# Patient Record
Sex: MALE | Race: WHITE | NOT HISPANIC OR LATINO | Employment: FULL TIME | URBAN - NONMETROPOLITAN AREA
[De-identification: names, ages, dates, MRNs, and addresses within clinical notes are randomized per-mention and may not be internally consistent; named-entity substitution may affect disease eponyms.]

---

## 2022-04-27 ENCOUNTER — HOSPITAL ENCOUNTER (EMERGENCY)
Facility: HOSPITAL | Age: 27
Discharge: HOME | End: 2022-04-27
Attending: EMERGENCY MEDICINE | Admitting: EMERGENCY MEDICINE

## 2022-04-27 VITALS
SYSTOLIC BLOOD PRESSURE: 129 MMHG | TEMPERATURE: 98 F | HEIGHT: 68 IN | HEART RATE: 67 BPM | DIASTOLIC BLOOD PRESSURE: 75 MMHG | BODY MASS INDEX: 22.73 KG/M2 | WEIGHT: 150 LBS | OXYGEN SATURATION: 100 % | RESPIRATION RATE: 15 BRPM

## 2022-04-27 DIAGNOSIS — T78.40XA ALLERGIC REACTION, INITIAL ENCOUNTER: Primary | ICD-10-CM

## 2022-04-27 PROCEDURE — 96375 TX/PRO/DX INJ NEW DRUG ADDON: CPT

## 2022-04-27 PROCEDURE — 99283 EMERGENCY DEPT VISIT LOW MDM: CPT

## 2022-04-27 PROCEDURE — 25010000002 DIPHENHYDRAMINE PER 50 MG: Performed by: EMERGENCY MEDICINE

## 2022-04-27 PROCEDURE — 25010000002 DEXAMETHASONE SODIUM PHOSPHATE 20 MG/5ML SOLUTION

## 2022-04-27 PROCEDURE — 96365 THER/PROPH/DIAG IV INF INIT: CPT

## 2022-04-27 RX ORDER — DIPHENHYDRAMINE HYDROCHLORIDE 50 MG/ML
50 INJECTION INTRAMUSCULAR; INTRAVENOUS ONCE
Status: COMPLETED | OUTPATIENT
Start: 2022-04-27 | End: 2022-04-27

## 2022-04-27 RX ORDER — PREDNISONE 20 MG/1
20 TABLET ORAL
Qty: 15 TABLET | Refills: 0 | Status: SHIPPED | OUTPATIENT
Start: 2022-04-27

## 2022-04-27 RX ORDER — SODIUM CHLORIDE 0.9 % (FLUSH) 0.9 %
10 SYRINGE (ML) INJECTION AS NEEDED
Status: DISCONTINUED | OUTPATIENT
Start: 2022-04-27 | End: 2022-04-27 | Stop reason: HOSPADM

## 2022-04-27 RX ORDER — CETIRIZINE HYDROCHLORIDE 10 MG/1
10 TABLET ORAL ONCE
Status: COMPLETED | OUTPATIENT
Start: 2022-04-27 | End: 2022-04-27

## 2022-04-27 RX ADMIN — CETIRIZINE HYDROCHLORIDE 10 MG: 10 TABLET, FILM COATED ORAL at 08:45

## 2022-04-27 RX ADMIN — DIPHENHYDRAMINE HYDROCHLORIDE 50 MG: 50 INJECTION, SOLUTION INTRAMUSCULAR; INTRAVENOUS at 08:45

## 2023-11-07 ENCOUNTER — APPOINTMENT (OUTPATIENT)
Dept: CT IMAGING | Facility: HOSPITAL | Age: 28
End: 2023-11-07
Payer: COMMERCIAL

## 2023-11-07 ENCOUNTER — HOSPITAL ENCOUNTER (EMERGENCY)
Facility: HOSPITAL | Age: 28
Discharge: HOME | End: 2023-11-07
Attending: STUDENT IN AN ORGANIZED HEALTH CARE EDUCATION/TRAINING PROGRAM | Admitting: STUDENT IN AN ORGANIZED HEALTH CARE EDUCATION/TRAINING PROGRAM
Payer: COMMERCIAL

## 2023-11-07 VITALS
BODY MASS INDEX: 21.48 KG/M2 | RESPIRATION RATE: 20 BRPM | TEMPERATURE: 98.5 F | HEART RATE: 81 BPM | OXYGEN SATURATION: 97 % | WEIGHT: 145 LBS | SYSTOLIC BLOOD PRESSURE: 141 MMHG | HEIGHT: 69 IN | DIASTOLIC BLOOD PRESSURE: 84 MMHG

## 2023-11-07 DIAGNOSIS — S02.2XXA CLOSED FRACTURE OF NASAL BONE, INITIAL ENCOUNTER: Primary | ICD-10-CM

## 2023-11-07 PROCEDURE — 99284 EMERGENCY DEPT VISIT MOD MDM: CPT

## 2023-11-07 PROCEDURE — 70486 CT MAXILLOFACIAL W/O DYE: CPT | Performed by: RADIOLOGY

## 2023-11-07 PROCEDURE — 96372 THER/PROPH/DIAG INJ SC/IM: CPT

## 2023-11-07 PROCEDURE — 70480 CT ORBIT/EAR/FOSSA W/O DYE: CPT | Performed by: RADIOLOGY

## 2023-11-07 PROCEDURE — 25010000002 KETOROLAC TROMETHAMINE PER 15 MG: Performed by: NURSE PRACTITIONER

## 2023-11-07 PROCEDURE — 70486 CT MAXILLOFACIAL W/O DYE: CPT

## 2023-11-07 PROCEDURE — 25010000002 DICYCLOMINE PER 20 MG: Performed by: NURSE PRACTITIONER

## 2023-11-07 PROCEDURE — 70480 CT ORBIT/EAR/FOSSA W/O DYE: CPT

## 2023-11-07 RX ORDER — DICYCLOMINE HYDROCHLORIDE 10 MG/ML
20 INJECTION INTRAMUSCULAR ONCE
Status: COMPLETED | OUTPATIENT
Start: 2023-11-07 | End: 2023-11-07

## 2023-11-07 RX ORDER — AMOXICILLIN AND CLAVULANATE POTASSIUM 875; 125 MG/1; MG/1
1 TABLET, FILM COATED ORAL ONCE
Status: COMPLETED | OUTPATIENT
Start: 2023-11-07 | End: 2023-11-07

## 2023-11-07 RX ORDER — KETOROLAC TROMETHAMINE 30 MG/ML
60 INJECTION, SOLUTION INTRAMUSCULAR; INTRAVENOUS ONCE
Status: COMPLETED | OUTPATIENT
Start: 2023-11-07 | End: 2023-11-07

## 2023-11-07 RX ORDER — HYDROCODONE BITARTRATE AND ACETAMINOPHEN 7.5; 325 MG/1; MG/1
1 TABLET ORAL EVERY 4 HOURS PRN
Qty: 18 TABLET | Refills: 0 | Status: SHIPPED | OUTPATIENT
Start: 2023-11-07 | End: 2023-11-10

## 2023-11-07 RX ORDER — AMOXICILLIN AND CLAVULANATE POTASSIUM 875; 125 MG/1; MG/1
1 TABLET, FILM COATED ORAL 2 TIMES DAILY
Qty: 14 TABLET | Refills: 0 | Status: SHIPPED | OUTPATIENT
Start: 2023-11-07 | End: 2023-11-14

## 2023-11-07 RX ORDER — HYDROCODONE BITARTRATE AND ACETAMINOPHEN 7.5; 325 MG/1; MG/1
1 TABLET ORAL ONCE
Status: COMPLETED | OUTPATIENT
Start: 2023-11-07 | End: 2023-11-07

## 2023-11-07 RX ADMIN — KETOROLAC TROMETHAMINE 60 MG: 60 INJECTION, SOLUTION INTRAMUSCULAR at 17:18

## 2023-11-07 RX ADMIN — HYDROCODONE BITARTRATE AND ACETAMINOPHEN 1 TABLET: 7.5; 325 TABLET ORAL at 15:33

## 2023-11-07 RX ADMIN — DICYCLOMINE HYDROCHLORIDE 20 MG: 20 INJECTION, SOLUTION INTRAMUSCULAR at 17:18

## 2023-11-07 RX ADMIN — AMOXICILLIN AND CLAVULANATE POTASSIUM 1 TABLET: 875; 125 TABLET, FILM COATED ORAL at 17:18

## 2023-11-07 NOTE — Clinical Note
[facility] EMERGENCY DEPARTMENT  [address] [facility] [address]  Phone: [phone]    Luke Wadsworth was seen and treated in our emergency department on 11/7/2023.  He may return to work on 11/09/2023.  ?       Thank you for choosing [facility].    Lizeth Calabrese APRN

## 2023-11-08 NOTE — ED PROVIDER NOTES
Subjective   History of Present Illness  Patient is a 28 year old male with no PMH. He presents to the ED for nasal injury and left facial injury. 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 He reports swelling, pain and bruising to his left side face and nose. Denies any other significant complaints.        Review of Systems   Constitutional: Negative.  Negative for fever.   HENT: Negative.  Positive for facial swelling.    Eyes: Negative.    Respiratory: Negative.     Cardiovascular: Negative.  Negative for chest pain.   Gastrointestinal: Negative.  Negative for abdominal pain.   Endocrine: Negative.    Genitourinary: Negative.  Negative for dysuria.   Musculoskeletal: Negative.    Skin: Negative.    Allergic/Immunologic: Negative.    Neurological: Negative.    Hematological: Negative.    Psychiatric/Behavioral: Negative.     All other systems reviewed and are negative.      No past medical history on file.    No Known Allergies    No past surgical history on file.    No family history on file.    Social History     Socioeconomic History    Marital status:            Objective   Physical Exam  Vitals and nursing note reviewed.   Constitutional:       General: He is not in acute distress.     Appearance: He is well-developed. He is not diaphoretic.   HENT:      Head: Normocephalic and atraumatic.        Right Ear: External ear normal.      Left Ear: External ear normal.      Nose: Signs of injury and nasal tenderness present. No septal deviation.     Eyes:      Conjunctiva/sclera: Conjunctivae normal.      Pupils: Pupils are equal, round, and reactive to light.   Neck:      Vascular: No JVD.      Trachea: No tracheal deviation.   Cardiovascular:      Rate and Rhythm: Normal rate and regular rhythm.      Heart sounds: Normal heart sounds. No murmur heard.  Pulmonary:      Effort: Pulmonary effort is normal. No respiratory distress.      Breath sounds:  Normal breath sounds. No wheezing.   Abdominal:      General: Bowel sounds are normal.      Palpations: Abdomen is soft.      Tenderness: There is no abdominal tenderness.   Musculoskeletal:         General: No deformity. Normal range of motion.      Cervical back: Normal range of motion and neck supple.   Skin:     General: Skin is warm and dry.      Coloration: Skin is not pale.      Findings: No erythema or rash.   Neurological:      Mental Status: He is alert and oriented to person, place, and time.      Cranial Nerves: No cranial nerve deficit.   Psychiatric:         Behavior: Behavior normal.         Thought Content: Thought content normal.         Procedures       CT Orbits Without Contrast   Final Result   1.  Left-sided soft tissue swelling.   2.  Again nasal bone fractures are noted.   3.  Bilateral maxillary sinus mucoperiosteal thickening.           This report was finalized on 11/7/2023 4:23 PM by Dr. Robbin Vincent MD.          CT Facial Bones Without Contrast   Final Result     Comminuted minimally depressed left anteriorly and right posterior   nasal bone fractures.           This report was finalized on 11/7/2023 4:23 PM by Dr. Robbin Vincent MD.               ED Course  ED Course as of 11/07/23 2233 Tue Nov 07, 2023   1700 D/w patient his CT results. Discussed need for close ENT follow up. He would like to goto the ENT group in [place]. He is going to call for an appt in the AM. I have also given him  ENT clinic number if he cannot get in to [place] in a timely manner. [MB]      ED Course User Index  [MB] Lizeth Calabrese APRN                                           Medical Decision Making  Problems Addressed:  Closed fracture of nasal bone, initial encounter: complicated acute illness or injury    Amount and/or Complexity of Data Reviewed  Radiology: ordered.    Risk  Prescription drug management.        Final diagnoses:   Closed fracture of nasal bone, initial encounter       ED  Disposition  ED Disposition       ED Disposition   Discharge    Condition   Stable    Comment   --               Enzo Pace MD  [address]  [address]  [facility] [address]  [phone]    Call in 1 day       ENT CLINIC  [address] [address] [phone]  Call in 1 day           Medication List        New Prescriptions      amoxicillin-clavulanate 875-125 MG per tablet  Commonly known as: AUGMENTIN  Take 1 tablet by mouth 2 (Two) Times a Day for 7 days.     HYDROcodone-acetaminophen 7.5-325 MG per tablet  Commonly known as: NORCO  Take 1 tablet by mouth Every 4 (Four) Hours As Needed for Moderate Pain for up to 3 days.            Stop      predniSONE 20 MG tablet  Commonly known as: DELTASONE               Where to Get Your Medications        These medications were sent to [facility][id number] [address] AT [facility] [address] [facility] - [phone]  - [phone] [address] [address]      Phone: [phone]   amoxicillin-clavulanate 875-125 MG per tablet  HYDROcodone-acetaminophen 7.5-325 MG per tablet            Lizeth Calabrese, APRN  11/07/23 2094

## 2024-06-15 ENCOUNTER — HOSPITAL ENCOUNTER (EMERGENCY)
Facility: HOSPITAL | Age: 29
Discharge: HOME | End: 2024-06-15
Attending: EMERGENCY MEDICINE
Payer: COMMERCIAL

## 2024-06-15 ENCOUNTER — APPOINTMENT (OUTPATIENT)
Dept: ULTRASOUND IMAGING | Facility: HOSPITAL | Age: 29
End: 2024-06-15
Payer: COMMERCIAL

## 2024-06-15 ENCOUNTER — APPOINTMENT (OUTPATIENT)
Dept: CT IMAGING | Facility: HOSPITAL | Age: 29
End: 2024-06-15
Payer: COMMERCIAL

## 2024-06-15 VITALS
HEIGHT: 69 IN | WEIGHT: 139 LBS | RESPIRATION RATE: 14 BRPM | BODY MASS INDEX: 20.59 KG/M2 | DIASTOLIC BLOOD PRESSURE: 82 MMHG | TEMPERATURE: 98.2 F | HEART RATE: 68 BPM | OXYGEN SATURATION: 96 % | SYSTOLIC BLOOD PRESSURE: 120 MMHG

## 2024-06-15 DIAGNOSIS — R10.9 FLANK PAIN: Primary | ICD-10-CM

## 2024-06-15 DIAGNOSIS — K59.00 CONSTIPATION, UNSPECIFIED CONSTIPATION TYPE: ICD-10-CM

## 2024-06-15 LAB
ALBUMIN SERPL-MCNC: 4.9 G/DL (ref 3.5–5.2)
ALBUMIN/GLOB SERPL: 1.5 G/DL
ALP SERPL-CCNC: 129 U/L (ref 39–117)
ALT SERPL W P-5'-P-CCNC: 16 U/L (ref 1–41)
ANION GAP SERPL CALCULATED.3IONS-SCNC: 12.2 MMOL/L (ref 5–15)
AST SERPL-CCNC: 24 U/L (ref 1–40)
BASOPHILS # BLD AUTO: 0.06 10*3/MM3 (ref 0–0.2)
BASOPHILS NFR BLD AUTO: 0.6 % (ref 0–1.5)
BILIRUB SERPL-MCNC: 0.9 MG/DL (ref 0–1.2)
BILIRUB UR QL STRIP: NEGATIVE
BUN SERPL-MCNC: 11 MG/DL (ref 6–20)
BUN/CREAT SERPL: 8 (ref 7–25)
CALCIUM SPEC-SCNC: 10.3 MG/DL (ref 8.6–10.5)
CHLORIDE SERPL-SCNC: 103 MMOL/L (ref 98–107)
CLARITY UR: CLEAR
CO2 SERPL-SCNC: 25.8 MMOL/L (ref 22–29)
COLOR UR: YELLOW
CREAT SERPL-MCNC: 1.37 MG/DL (ref 0.76–1.27)
DEPRECATED RDW RBC AUTO: 37 FL (ref 37–54)
EGFRCR SERPLBLD CKD-EPI 2021: 73 ML/MIN/1.73
EOSINOPHIL # BLD AUTO: 0.3 10*3/MM3 (ref 0–0.4)
EOSINOPHIL NFR BLD AUTO: 3.2 % (ref 0.3–6.2)
ERYTHROCYTE [DISTWIDTH] IN BLOOD BY AUTOMATED COUNT: 12.3 % (ref 12.3–15.4)
GLOBULIN UR ELPH-MCNC: 3.2 GM/DL
GLUCOSE SERPL-MCNC: 100 MG/DL (ref 65–99)
GLUCOSE UR STRIP-MCNC: NEGATIVE MG/DL
HCT VFR BLD AUTO: 45.9 % (ref 37.5–51)
HGB BLD-MCNC: 15.7 G/DL (ref 13–17.7)
HGB UR QL STRIP.AUTO: NEGATIVE
IMM GRANULOCYTES # BLD AUTO: 0.02 10*3/MM3 (ref 0–0.05)
IMM GRANULOCYTES NFR BLD AUTO: 0.2 % (ref 0–0.5)
KETONES UR QL STRIP: NEGATIVE
LEUKOCYTE ESTERASE UR QL STRIP.AUTO: NEGATIVE
LYMPHOCYTES # BLD AUTO: 1.69 10*3/MM3 (ref 0.7–3.1)
LYMPHOCYTES NFR BLD AUTO: 18 % (ref 19.6–45.3)
MCH RBC QN AUTO: 28.9 PG (ref 26.6–33)
MCHC RBC AUTO-ENTMCNC: 34.2 G/DL (ref 31.5–35.7)
MCV RBC AUTO: 84.5 FL (ref 79–97)
MONOCYTES # BLD AUTO: 0.99 10*3/MM3 (ref 0.1–0.9)
MONOCYTES NFR BLD AUTO: 10.6 % (ref 5–12)
NEUTROPHILS NFR BLD AUTO: 6.31 10*3/MM3 (ref 1.7–7)
NEUTROPHILS NFR BLD AUTO: 67.4 % (ref 42.7–76)
NITRITE UR QL STRIP: NEGATIVE
NRBC BLD AUTO-RTO: 0 /100 WBC (ref 0–0.2)
PH UR STRIP.AUTO: 5.5 [PH] (ref 5–8)
PLATELET # BLD AUTO: 353 10*3/MM3 (ref 140–450)
PMV BLD AUTO: 9.7 FL (ref 6–12)
POTASSIUM SERPL-SCNC: 3.9 MMOL/L (ref 3.5–5.2)
PROT SERPL-MCNC: 8.1 G/DL (ref 6–8.5)
PROT UR QL STRIP: NEGATIVE
RBC # BLD AUTO: 5.43 10*6/MM3 (ref 4.14–5.8)
SODIUM SERPL-SCNC: 141 MMOL/L (ref 136–145)
SP GR UR STRIP: 1.01 (ref 1–1.03)
UROBILINOGEN UR QL STRIP: NORMAL
WBC NRBC COR # BLD AUTO: 9.37 10*3/MM3 (ref 3.4–10.8)

## 2024-06-15 PROCEDURE — 76705 ECHO EXAM OF ABDOMEN: CPT

## 2024-06-15 PROCEDURE — 76705 ECHO EXAM OF ABDOMEN: CPT | Performed by: RADIOLOGY

## 2024-06-15 PROCEDURE — 80053 COMPREHEN METABOLIC PANEL: CPT | Performed by: NURSE PRACTITIONER

## 2024-06-15 PROCEDURE — 99284 EMERGENCY DEPT VISIT MOD MDM: CPT

## 2024-06-15 PROCEDURE — 85025 COMPLETE CBC W/AUTO DIFF WBC: CPT | Performed by: NURSE PRACTITIONER

## 2024-06-15 PROCEDURE — 74176 CT ABD & PELVIS W/O CONTRAST: CPT

## 2024-06-15 PROCEDURE — 81003 URINALYSIS AUTO W/O SCOPE: CPT | Performed by: NURSE PRACTITIONER

## 2024-06-15 PROCEDURE — 96375 TX/PRO/DX INJ NEW DRUG ADDON: CPT

## 2024-06-15 PROCEDURE — 25010000002 KETOROLAC TROMETHAMINE PER 15 MG: Performed by: NURSE PRACTITIONER

## 2024-06-15 PROCEDURE — 25010000002 ONDANSETRON PER 1 MG: Performed by: NURSE PRACTITIONER

## 2024-06-15 PROCEDURE — 25810000003 SODIUM CHLORIDE 0.9 % SOLUTION: Performed by: NURSE PRACTITIONER

## 2024-06-15 PROCEDURE — 25010000002 HYDROMORPHONE PER 4 MG: Performed by: NURSE PRACTITIONER

## 2024-06-15 PROCEDURE — 96374 THER/PROPH/DIAG INJ IV PUSH: CPT

## 2024-06-15 PROCEDURE — 74176 CT ABD & PELVIS W/O CONTRAST: CPT | Performed by: RADIOLOGY

## 2024-06-15 PROCEDURE — 96361 HYDRATE IV INFUSION ADD-ON: CPT

## 2024-06-15 RX ORDER — KETOROLAC TROMETHAMINE 10 MG/1
10 TABLET, FILM COATED ORAL EVERY 6 HOURS PRN
Qty: 12 TABLET | Refills: 0 | Status: SHIPPED | OUTPATIENT
Start: 2024-06-15 | End: 2024-07-02

## 2024-06-15 RX ORDER — HYDROMORPHONE HYDROCHLORIDE 1 MG/ML
0.5 INJECTION, SOLUTION INTRAMUSCULAR; INTRAVENOUS; SUBCUTANEOUS ONCE
Status: COMPLETED | OUTPATIENT
Start: 2024-06-15 | End: 2024-06-15

## 2024-06-15 RX ORDER — ONDANSETRON 4 MG/1
4 TABLET, ORALLY DISINTEGRATING ORAL EVERY 6 HOURS PRN
Qty: 12 TABLET | Refills: 0 | Status: SHIPPED | OUTPATIENT
Start: 2024-06-15 | End: 2024-07-02

## 2024-06-15 RX ORDER — LACTULOSE 10 G/15ML
20 SOLUTION ORAL 2 TIMES DAILY PRN
Qty: 237 ML | Refills: 0 | Status: SHIPPED | OUTPATIENT
Start: 2024-06-15 | End: 2024-07-02

## 2024-06-15 RX ORDER — ONDANSETRON 2 MG/ML
4 INJECTION INTRAMUSCULAR; INTRAVENOUS ONCE
Status: COMPLETED | OUTPATIENT
Start: 2024-06-15 | End: 2024-06-15

## 2024-06-15 RX ORDER — SODIUM CHLORIDE 0.9 % (FLUSH) 0.9 %
10 SYRINGE (ML) INJECTION AS NEEDED
Status: DISCONTINUED | OUTPATIENT
Start: 2024-06-15 | End: 2024-06-15 | Stop reason: HOSPADM

## 2024-06-15 RX ORDER — KETOROLAC TROMETHAMINE 30 MG/ML
15 INJECTION, SOLUTION INTRAMUSCULAR; INTRAVENOUS ONCE
Status: COMPLETED | OUTPATIENT
Start: 2024-06-15 | End: 2024-06-15

## 2024-06-15 RX ADMIN — KETOROLAC TROMETHAMINE 15 MG: 30 INJECTION, SOLUTION INTRAMUSCULAR; INTRAVENOUS at 18:07

## 2024-06-15 RX ADMIN — SODIUM CHLORIDE 1000 ML: 9 INJECTION, SOLUTION INTRAVENOUS at 15:47

## 2024-06-15 RX ADMIN — HYDROMORPHONE HYDROCHLORIDE 0.5 MG: 1 INJECTION, SOLUTION INTRAMUSCULAR; INTRAVENOUS; SUBCUTANEOUS at 15:47

## 2024-06-15 RX ADMIN — ONDANSETRON 4 MG: 2 INJECTION INTRAMUSCULAR; INTRAVENOUS at 15:47

## 2024-06-15 NOTE — ED PROVIDER NOTES
Subjective   History of Present Illness  Patient is a 28-year-old male presents to the emergency room today complaining of right upper quadrant and right-sided abdominal pain.  Patient reports he has had the pain for a few days states he has had it off and on for some time.  Patient reports he has had problems gallbladder in the past.  Patient denies fever.  Patient denies chest pain or shortness of breath.  Patient denies any hematemesis.  Patient denies melena or hematochezia.  Patient does report some nausea and vomiting.        Review of Systems   Constitutional: Negative.    HENT: Negative.     Eyes: Negative.    Respiratory: Negative.     Cardiovascular: Negative.    Gastrointestinal: Negative.    Endocrine: Negative.    Genitourinary: Negative.    Musculoskeletal: Negative.    Skin: Negative.    Allergic/Immunologic: Negative.    Neurological: Negative.    Hematological: Negative.    Psychiatric/Behavioral: Negative.         No past medical history on file.    No Known Allergies    No past surgical history on file.    No family history on file.    Social History     Socioeconomic History    Marital status:            Objective   Physical Exam  Vitals and nursing note reviewed.   Constitutional:       Appearance: He is well-developed.   HENT:      Head: Normocephalic.      Right Ear: External ear normal.      Left Ear: External ear normal.   Eyes:      Conjunctiva/sclera: Conjunctivae normal.      Pupils: Pupils are equal, round, and reactive to light.   Cardiovascular:      Rate and Rhythm: Normal rate and regular rhythm.      Heart sounds: Normal heart sounds.   Pulmonary:      Effort: Pulmonary effort is normal.      Breath sounds: Normal breath sounds.   Abdominal:      General: Bowel sounds are normal.      Palpations: Abdomen is soft.      Tenderness: There is abdominal tenderness in the right upper quadrant and epigastric area.   Musculoskeletal:         General: Normal range of motion.       Cervical back: Normal range of motion and neck supple.   Skin:     General: Skin is warm and dry.      Capillary Refill: Capillary refill takes less than 2 seconds.   Neurological:      Mental Status: He is alert and oriented to person, place, and time.   Psychiatric:         Behavior: Behavior normal.         Thought Content: Thought content normal.         Procedures           ED Course                                             Medical Decision Making  Problems Addressed:  Constipation, unspecified constipation type: complicated acute illness or injury  Flank pain: complicated acute illness or injury    Amount and/or Complexity of Data Reviewed  Labs: ordered.  Radiology: ordered.    Risk  Prescription drug management.        Final diagnoses:   Flank pain   Constipation, unspecified constipation type       ED Disposition  ED Disposition       ED Disposition   Discharge    Condition   Stable    Comment   --               No follow-up provider specified.       Medication List      No changes were made to your prescriptions during this visit.

## 2024-06-28 ENCOUNTER — TRANSCRIBE ORDERS (OUTPATIENT)
Dept: ADMINISTRATIVE | Facility: HOSPITAL | Age: 29
End: 2024-06-28
Payer: COMMERCIAL

## 2024-06-28 DIAGNOSIS — K82.8 ADHESION OF GALLBLADDER: Primary | ICD-10-CM

## 2024-07-02 ENCOUNTER — HOSPITAL ENCOUNTER (OUTPATIENT)
Dept: NUCLEAR MEDICINE | Facility: HOSPITAL | Age: 29
Discharge: HOME | End: 2024-07-02
Payer: COMMERCIAL

## 2024-07-02 ENCOUNTER — OFFICE VISIT (OUTPATIENT)
Dept: SURGERY | Facility: CLINIC | Age: 29
End: 2024-07-02
Payer: COMMERCIAL

## 2024-07-02 VITALS
WEIGHT: 144 LBS | DIASTOLIC BLOOD PRESSURE: 74 MMHG | SYSTOLIC BLOOD PRESSURE: 118 MMHG | HEIGHT: 69 IN | BODY MASS INDEX: 21.33 KG/M2

## 2024-07-02 DIAGNOSIS — K42.9 UMBILICAL HERNIA WITHOUT OBSTRUCTION AND WITHOUT GANGRENE: ICD-10-CM

## 2024-07-02 DIAGNOSIS — K82.8 ADHESION OF GALLBLADDER: ICD-10-CM

## 2024-07-02 DIAGNOSIS — R10.11 RUQ PAIN: Primary | ICD-10-CM

## 2024-07-02 PROCEDURE — 78227 HEPATOBIL SYST IMAGE W/DRUG: CPT

## 2024-07-02 PROCEDURE — 0 TECHNETIUM TC 99M MEBROFENIN KIT: Performed by: INTERNAL MEDICINE

## 2024-07-02 PROCEDURE — A9537 TC99M MEBROFENIN: HCPCS | Performed by: INTERNAL MEDICINE

## 2024-07-02 PROCEDURE — 25010000002 SINCALIDE PER 5 MCG: Performed by: INTERNAL MEDICINE

## 2024-07-02 RX ORDER — SODIUM CHLORIDE 9 MG/ML
40 INJECTION, SOLUTION INTRAVENOUS AS NEEDED
OUTPATIENT
Start: 2024-07-02

## 2024-07-02 RX ORDER — KIT FOR THE PREPARATION OF TECHNETIUM TC 99M MEBROFENIN 45 MG/10ML
1 INJECTION, POWDER, LYOPHILIZED, FOR SOLUTION INTRAVENOUS
Status: COMPLETED | OUTPATIENT
Start: 2024-07-02 | End: 2024-07-02

## 2024-07-02 RX ORDER — SODIUM CHLORIDE 0.9 % (FLUSH) 0.9 %
10 SYRINGE (ML) INJECTION AS NEEDED
OUTPATIENT
Start: 2024-07-02

## 2024-07-02 RX ORDER — SODIUM CHLORIDE 0.9 % (FLUSH) 0.9 %
3 SYRINGE (ML) INJECTION EVERY 12 HOURS SCHEDULED
OUTPATIENT
Start: 2024-07-02

## 2024-07-02 RX ORDER — ACETAMINOPHEN 325 MG/1
650 TABLET ORAL ONCE
OUTPATIENT
Start: 2024-07-02 | End: 2024-07-02

## 2024-07-02 RX ORDER — INDOCYANINE GREEN AND WATER 25 MG
7.5 KIT INJECTION ONCE
OUTPATIENT
Start: 2024-07-02 | End: 2024-07-02

## 2024-07-02 RX ORDER — SINCALIDE 5 UG/5ML
0.04 INJECTION, POWDER, LYOPHILIZED, FOR SOLUTION INTRAVENOUS
Status: COMPLETED | OUTPATIENT
Start: 2024-07-02 | End: 2024-07-02

## 2024-07-02 RX ADMIN — MEBROFENIN 1 DOSE: 45 INJECTION, POWDER, LYOPHILIZED, FOR SOLUTION INTRAVENOUS at 07:14

## 2024-07-02 RX ADMIN — SINCALIDE 2.5 MCG: 5 INJECTION, POWDER, LYOPHILIZED, FOR SOLUTION INTRAVENOUS at 08:24

## 2024-07-02 NOTE — H&P (VIEW-ONLY)
Subjective   Luke Wadsworth is a 28 y.o. male who presents today for Initial Evaluation    Chief Complaint:    Chief Complaint   Patient presents with   • Abdominal Pain        History of Present Illness:    History of Present Illness Luke is a 28-year-old male presents for an evaluation for right upper quadrant pain.  He reports that he has had right upper quadrant pain and cramping for several years now.  Reports that he also has nausea, vomiting and occasional diarrhea.  He does report that his symptoms are worsened and aggravated by greasy and fatty foods.  He denies any past abdominal surgeries.  Does report that his pain and symptoms have worsened over time.  Complains of having pain to his epigastric area with radiation to his back as well.  Recently had ultrasound, CT and HIDA scan.  Also states that he has an umbilical hernia.  Reports that it occasionally causes some pain at times.  He currently works in construction.    The following portions of the patient's history were reviewed and updated as appropriate: allergies, current medications, past family history, past medical history, past social history, past surgical history and problem list.    Past Medical History:  History reviewed. No pertinent past medical history.    Social History:  Social History     Socioeconomic History   • Marital status:    Tobacco Use   • Smoking status: Never     Passive exposure: Current   • Smokeless tobacco: Current     Types: Chew, Snuff   Vaping Use   • Vaping status: Every Day   • Substances: Nicotine   • Devices: Disposable   Substance and Sexual Activity   • Alcohol use: Yes     Alcohol/week: 20.0 standard drinks of alcohol     Types: 20 Cans of beer per week     Comment: WEEKLY   • Drug use: Never   • Sexual activity: Defer       Family History:  History reviewed. No pertinent family history.    Past Surgical History:  History reviewed. No pertinent surgical history.    Problem List:  There is no problem  "list on file for this patient.      Allergy:   No Known Allergies     Current Medications:   No current outpatient medications on file.     No current facility-administered medications for this visit.     Facility-Administered Medications Ordered in Other Visits   Medication Dose Route Frequency Provider Last Rate Last Admin   • technetium sestamibi (CARDIOLITE) injection 1 dose  1 dose Intravenous Once in imaging Raina Hector MD           Review of Systems:    Review of Systems   Gastrointestinal:  Positive for abdominal pain, diarrhea, nausea and vomiting.         Physical Exam:   Physical Exam  Constitutional:       Appearance: Normal appearance.   HENT:      Head: Normocephalic and atraumatic.      Right Ear: External ear normal.      Left Ear: External ear normal.   Eyes:      Conjunctiva/sclera: Conjunctivae normal.   Pulmonary:      Effort: Pulmonary effort is normal.   Abdominal:      General: Abdomen is flat.   Musculoskeletal:         General: Normal range of motion.      Cervical back: Normal range of motion and neck supple.   Skin:     General: Skin is warm.      Capillary Refill: Capillary refill takes less than 2 seconds.   Neurological:      General: No focal deficit present.      Mental Status: He is alert and oriented to person, place, and time.   Psychiatric:         Mood and Affect: Mood normal.         Behavior: Behavior normal.       Vitals:  Blood pressure 118/74, height 175.3 cm (69.02\"), weight 65.3 kg (144 lb).   Body mass index is 21.26 kg/m².     Lab Results:   Admission on 06/15/2024, Discharged on 06/15/2024   Component Date Value Ref Range Status   • Glucose 06/15/2024 100 (H)  65 - 99 mg/dL Final   • BUN 06/15/2024 11  6 - 20 mg/dL Final   • Creatinine 06/15/2024 1.37 (H)  0.76 - 1.27 mg/dL Final   • Sodium 06/15/2024 141  136 - 145 mmol/L Final   • Potassium 06/15/2024 3.9  3.5 - 5.2 mmol/L Final   • Chloride 06/15/2024 103  98 - 107 mmol/L Final   • CO2 06/15/2024 25.8  " 22.0 - 29.0 mmol/L Final   • Calcium 06/15/2024 10.3  8.6 - 10.5 mg/dL Final   • Total Protein 06/15/2024 8.1  6.0 - 8.5 g/dL Final   • Albumin 06/15/2024 4.9  3.5 - 5.2 g/dL Final   • ALT (SGPT) 06/15/2024 16  1 - 41 U/L Final   • AST (SGOT) 06/15/2024 24  1 - 40 U/L Final   • Alkaline Phosphatase 06/15/2024 129 (H)  39 - 117 U/L Final   • Total Bilirubin 06/15/2024 0.9  0.0 - 1.2 mg/dL Final   • Globulin 06/15/2024 3.2  gm/dL Final   • A/G Ratio 06/15/2024 1.5  g/dL Final   • BUN/Creatinine Ratio 06/15/2024 8.0  7.0 - 25.0 Final   • Anion Gap 06/15/2024 12.2  5.0 - 15.0 mmol/L Final   • eGFR 06/15/2024 73.0  >60.0 mL/min/1.73 Final   • Color, UA 06/15/2024 Yellow  Yellow, Straw Final   • Appearance, UA 06/15/2024 Clear  Clear Final   • pH, UA 06/15/2024 5.5  5.0 - 8.0 Final   • Specific Gravity, UA 06/15/2024 1.007  1.005 - 1.030 Final   • Glucose, UA 06/15/2024 Negative  Negative Final   • Ketones, UA 06/15/2024 Negative  Negative Final   • Bilirubin, UA 06/15/2024 Negative  Negative Final   • Blood, UA 06/15/2024 Negative  Negative Final   • Protein, UA 06/15/2024 Negative  Negative Final   • Leuk Esterase, UA 06/15/2024 Negative  Negative Final   • Nitrite, UA 06/15/2024 Negative  Negative Final   • Urobilinogen, UA 06/15/2024 0.2 E.U./dL  0.2 - 1.0 E.U./dL Final   • WBC 06/15/2024 9.37  3.40 - 10.80 10*3/mm3 Final   • RBC 06/15/2024 5.43  4.14 - 5.80 10*6/mm3 Final   • Hemoglobin 06/15/2024 15.7  13.0 - 17.7 g/dL Final   • Hematocrit 06/15/2024 45.9  37.5 - 51.0 % Final   • MCV 06/15/2024 84.5  79.0 - 97.0 fL Final   • MCH 06/15/2024 28.9  26.6 - 33.0 pg Final   • MCHC 06/15/2024 34.2  31.5 - 35.7 g/dL Final   • RDW 06/15/2024 12.3  12.3 - 15.4 % Final   • RDW-SD 06/15/2024 37.0  37.0 - 54.0 fl Final   • MPV 06/15/2024 9.7  6.0 - 12.0 fL Final   • Platelets 06/15/2024 353  140 - 450 10*3/mm3 Final   • Neutrophil % 06/15/2024 67.4  42.7 - 76.0 % Final   • Lymphocyte % 06/15/2024 18.0 (L)  19.6 - 45.3 % Final    • Monocyte % 06/15/2024 10.6  5.0 - 12.0 % Final   • Eosinophil % 06/15/2024 3.2  0.3 - 6.2 % Final   • Basophil % 06/15/2024 0.6  0.0 - 1.5 % Final   • Immature Grans % 06/15/2024 0.2  0.0 - 0.5 % Final   • Neutrophils, Absolute 06/15/2024 6.31  1.70 - 7.00 10*3/mm3 Final   • Lymphocytes, Absolute 06/15/2024 1.69  0.70 - 3.10 10*3/mm3 Final   • Monocytes, Absolute 06/15/2024 0.99 (H)  0.10 - 0.90 10*3/mm3 Final   • Eosinophils, Absolute 06/15/2024 0.30  0.00 - 0.40 10*3/mm3 Final   • Basophils, Absolute 06/15/2024 0.06  0.00 - 0.20 10*3/mm3 Final   • Immature Grans, Absolute 06/15/2024 0.02  0.00 - 0.05 10*3/mm3 Final   • nRBC 06/15/2024 0.0  0.0 - 0.2 /100 WBC Final       Imaging:   NM HIDA SCAN WITH PHARMACOLOGICAL INTERVENTION  Narrative: EXAMINATION: NM HIDA SCAN WITH PHARMACOLOGICAL INTERVENTION-      CLINICAL INDICATION: K82.8; K82.8-Other specified diseases of  gallbladder        COMPARISON: None available     PROCEDURE:  6 mCi technetium Choletec was administered. Dynamic imaging of the liver  and biliary tree was performed for 46 minutes     2.5 mcg of Kinevac was then injected and images were acquired for 30  minutes     FINDINGS:  There was visualization of the gallbladder within an hour after the  Choletec     Following the Kinevac, there was a 53% ejection fraction.     Impression: Study results were within normal limits        This report was finalized on 7/2/2024 8:56 AM by Dr. Dre Flanagan MD.           Assessment & Plan   Diagnoses and all orders for this visit:    1. RUQ pain (Primary)  -     Case Request; Standing  -     sodium chloride 0.9 % flush 3 mL  -     sodium chloride 0.9 % flush 10 mL  -     sodium chloride 0.9 % infusion 40 mL  -     acetaminophen (TYLENOL) tablet 650 mg  -     ceFAZolin 2000 mg IVPB in 100 mL NS (VTB)  -     indocyanine green (IC-GREEN) injection 7.5 mg  -     Case Request  -     Case Request; Standing  -     sodium chloride 0.9 % flush 3 mL  -     sodium chloride  0.9 % flush 10 mL  -     sodium chloride 0.9 % infusion 40 mL  -     acetaminophen (TYLENOL) tablet 650 mg  -     ceFAZolin 2000 mg IVPB in 100 mL NS (VTB)  -     indocyanine green (IC-GREEN) injection 7.5 mg  -     Case Request    2. Umbilical hernia without obstruction and without gangrene  -     Case Request; Standing  -     sodium chloride 0.9 % flush 3 mL  -     sodium chloride 0.9 % flush 10 mL  -     sodium chloride 0.9 % infusion 40 mL  -     acetaminophen (TYLENOL) tablet 650 mg  -     ceFAZolin 2000 mg IVPB in 100 mL NS (VTB)  -     indocyanine green (IC-GREEN) injection 7.5 mg  -     Case Request    Other orders  -     Cancel: Follow Anesthesia Guidelines / Protocol; Future  -     Follow Anesthesia Guidelines / Protocol; Standing  -     Verify / Perform Chlorhexidine Skin Prep; Standing  -     Obtain Informed Consent; Future  -     Provide NPO Instructions to Patient; Future  -     Chlorhexidine Skin Prep; Future  -     Insert Peripheral IV; Standing  -     Saline Lock & Maintain IV Access; Standing  -     Place Sequential Compression Device; Standing  -     Maintain Sequential Compression Device; Standing  -     Follow Anesthesia Guidelines / Protocol; Future  -     Follow Anesthesia Guidelines / Protocol; Standing  -     Verify / Perform Chlorhexidine Skin Prep; Standing  -     Obtain Informed Consent; Future  -     Provide NPO Instructions to Patient; Future  -     Chlorhexidine Skin Prep; Future  -     Insert Peripheral IV; Standing  -     Saline Lock & Maintain IV Access; Standing  -     Place Sequential Compression Device; Standing  -     Maintain Sequential Compression Device; Standing    Luke is a 28-year-old male presents for evaluation for right upper quadrant pain and umbilical hernia.  He undergo a robotic cholecystectomy umbilical hernia.  Dr. Vargas.  Patient verbalized understanding that HIDA scan and ejection fraction that is 53% and that there is potential cholecystectomy would not  resolve his symptoms.  He wishes to proceed with surgery.    Visit Diagnoses:    ICD-10-CM ICD-9-CM   1. RUQ pain  R10.11 789.01   2. Umbilical hernia without obstruction and without gangrene  K42.9 553.1         MEDS ORDERED DURING VISIT:  No orders of the defined types were placed in this encounter.      Return for Follow-up postop.             This document has been electronically signed by ARPAN Cruz  July 2, 2024 14:38 EDT    Please note that portions of this note were completed with a voice recognition program.

## 2024-07-08 PROBLEM — R10.11 RUQ PAIN: Status: ACTIVE | Noted: 2024-07-02

## 2024-07-08 PROBLEM — K42.9 UMBILICAL HERNIA WITHOUT OBSTRUCTION AND WITHOUT GANGRENE: Status: ACTIVE | Noted: 2024-07-02

## 2024-07-22 ENCOUNTER — DOCUMENTATION (OUTPATIENT)
Dept: SURGERY | Facility: CLINIC | Age: 29
End: 2024-07-22
Payer: COMMERCIAL

## 2024-07-22 NOTE — PROGRESS NOTES
You are scheduled for surgery with Dr. Vargas on 7/25/24 at 8:00.   Do not eat/drink anything after midnight the night prior to surgery, and you must have a  present with you on day of surgery.  An appointment for pre-op has been scheduled for 7/23/24 at 9:45am. This is a visit with surgical nurses and the anesthesia team to draw blood work and review your medical history and current medications.  Arrive at outpatient surgery on the ground floor of the hospital both of these days. Outpatient surgery is located on the opposite side of the ER location. Follow the arrows for surgery on the [facility] signs posted along the [facility] hill. If you have any questions please call the office at [phone].        Patient is aware with no questions at this time.

## 2024-07-22 NOTE — DISCHARGE INSTRUCTIONS
7/25/24  ARRIVAL TIME PER DR OFFICE    HOLD all diabetic medications the morning of surgery as ordered by physician.    Please discontinue all blood thinners and anticoagulants (except aspirin) prior to surgery as per your surgeon and cardiologist instructions.  Aspirin may be continued up to the day prior to surgery.     CHLORHEXIDINE CLOTHS GIVEN WITH INSTRUCTIONS AND FORM TO RETURN TO HOSPITAL, IF APPLICABLE.    General Instructions:  Do not eat or drink after midnight:7/24/24  includes water, mints, or gum. You may brush your teeth.  Dental appliances that are removable must be taken out day of surgery.  Do not smoke, chew tobacco, or drink alcohol 24 hours prior to surgery.  Bring medications in original bottles, any inhalers and if applicable your C-PAP/BI-PAP machine.  Bring any papers given to you in the doctor's office.  Wear clean comfortable clothes and socks.  Do not wear contact lenses or make-up. Bring a case for your glasses if applicable.  Bring crutches or walker if applicable.  Leave all other valuables and jewelry at home.    If you were given a blood bank ID arm band remember to bring it with you the day of surgery.    Preventing a Surgical Site Infection:  Shower the night before surgery (unless instructed other wise) using a fresh bar of anti-bacterial soap (such as Dial) and clean washcloth. Dry with a clean towel and dress in clean clothing.  For 2 to 3 days before surgery, avoid shaving with a razor near where you will have surgery because the razor can irritate skin and make it easier to develop an infection. Ask your surgeon if you will be receiving antibiotics prior to surgery.  Make sure you, your family, and all healthcare providers clear their hands with soap and water or an alcohol-based hand  before caring for you or your wound.  If at all possible, quit smoking as many days before surgery as you can.    Day of surgery:  Upon arrival, a Pre-op nurse and Anesthesiologist  will review your health history, obtain vital signs, and answer questions you may have. The only belongings needed at this time will be your home medications and if applicable your C-PAP/BI-PAP machine. If you are staying overnight your family can leave the rest of your belongings in the car and bring them to your room later. A Pre-op nurse will start an IV and you may receive medication in preparation for surgery, including something to help you relax. Your family will be able to see you in the Pre-op area. While you are in surgery your family should notify the waiting room  if they leave the waiting room area and provide a contact phone number.    Please be aware that surgery does come with discomfort. We want to make every effort to control your discomfort so please discuss any uncontrolled symptoms with your nurse. Your doctor will most likely have prescribed pain medications.    If you are going home after surgery you will receive individualized written care instructions before being discharged. A responsible adult must drive you to and from the hospital on the day of surgery and stay with you for 24 hours.    If you are staying overnight following surgery, you will be transported to your hospital room following the recovery period.     If you have any questions please call Pre-Admission Testing at [phone] Ext [phone] Monday-Friday 8:00-3:00  Deductibles and co-payments are collected on the day of service. Please be prepared to pay the required co-pay, deductible or deposit on the day of service as defined by your plan.    A RESPONSIBLE PERSON MUST REMAIN IN THE WAITING ROOM DURING YOUR PROCEDURE AND A RESPONSIBLE  MUST BE AVAILABLE UPON YOUR DISCHARGE.

## 2024-07-23 ENCOUNTER — PRE-ADMISSION TESTING (OUTPATIENT)
Dept: PREADMISSION TESTING | Facility: HOSPITAL | Age: 29
End: 2024-07-23
Payer: COMMERCIAL

## 2024-07-23 LAB
ANION GAP SERPL CALCULATED.3IONS-SCNC: 11.5 MMOL/L (ref 5–15)
BUN SERPL-MCNC: 10 MG/DL (ref 6–20)
BUN/CREAT SERPL: 11.4 (ref 7–25)
CALCIUM SPEC-SCNC: 9.8 MG/DL (ref 8.6–10.5)
CHLORIDE SERPL-SCNC: 104 MMOL/L (ref 98–107)
CO2 SERPL-SCNC: 25.5 MMOL/L (ref 22–29)
CREAT SERPL-MCNC: 0.88 MG/DL (ref 0.76–1.27)
DEPRECATED RDW RBC AUTO: 38.5 FL (ref 37–54)
EGFRCR SERPLBLD CKD-EPI 2021: 120.9 ML/MIN/1.73
ERYTHROCYTE [DISTWIDTH] IN BLOOD BY AUTOMATED COUNT: 12.4 % (ref 12.3–15.4)
GLUCOSE SERPL-MCNC: 72 MG/DL (ref 65–99)
HCT VFR BLD AUTO: 45.7 % (ref 37.5–51)
HGB BLD-MCNC: 15 G/DL (ref 13–17.7)
MCH RBC QN AUTO: 28.2 PG (ref 26.6–33)
MCHC RBC AUTO-ENTMCNC: 32.8 G/DL (ref 31.5–35.7)
MCV RBC AUTO: 85.9 FL (ref 79–97)
PLATELET # BLD AUTO: 328 10*3/MM3 (ref 140–450)
PMV BLD AUTO: 10.3 FL (ref 6–12)
POTASSIUM SERPL-SCNC: 4 MMOL/L (ref 3.5–5.2)
RBC # BLD AUTO: 5.32 10*6/MM3 (ref 4.14–5.8)
SODIUM SERPL-SCNC: 141 MMOL/L (ref 136–145)
WBC NRBC COR # BLD AUTO: 5.26 10*3/MM3 (ref 3.4–10.8)

## 2024-07-23 PROCEDURE — 80048 BASIC METABOLIC PNL TOTAL CA: CPT

## 2024-07-23 PROCEDURE — 85027 COMPLETE CBC AUTOMATED: CPT

## 2024-07-23 PROCEDURE — 36415 COLL VENOUS BLD VENIPUNCTURE: CPT

## 2024-07-23 RX ORDER — ALBUTEROL SULFATE 90 UG/1
2 AEROSOL, METERED RESPIRATORY (INHALATION) EVERY 4 HOURS PRN
COMMUNITY

## 2024-07-25 ENCOUNTER — ANESTHESIA (OUTPATIENT)
Dept: PERIOP | Facility: HOSPITAL | Age: 29
End: 2024-07-25
Payer: COMMERCIAL

## 2024-07-25 ENCOUNTER — HOSPITAL ENCOUNTER (OUTPATIENT)
Facility: HOSPITAL | Age: 29
Setting detail: HOSPITAL OUTPATIENT SURGERY
Discharge: HOME | End: 2024-07-25
Attending: SURGERY | Admitting: SURGERY
Payer: COMMERCIAL

## 2024-07-25 ENCOUNTER — ANESTHESIA EVENT (OUTPATIENT)
Dept: PERIOP | Facility: HOSPITAL | Age: 29
End: 2024-07-25
Payer: COMMERCIAL

## 2024-07-25 VITALS
HEART RATE: 88 BPM | BODY MASS INDEX: 21.45 KG/M2 | TEMPERATURE: 97.9 F | SYSTOLIC BLOOD PRESSURE: 132 MMHG | OXYGEN SATURATION: 99 % | HEIGHT: 69 IN | RESPIRATION RATE: 16 BRPM | WEIGHT: 144.8 LBS | DIASTOLIC BLOOD PRESSURE: 75 MMHG

## 2024-07-25 DIAGNOSIS — R10.11 RUQ PAIN: ICD-10-CM

## 2024-07-25 DIAGNOSIS — K42.9 UMBILICAL HERNIA WITHOUT OBSTRUCTION AND WITHOUT GANGRENE: ICD-10-CM

## 2024-07-25 PROCEDURE — 25010000002 PROPOFOL 200 MG/20ML EMULSION: Performed by: NURSE ANESTHETIST, CERTIFIED REGISTERED

## 2024-07-25 PROCEDURE — 25010000002 GLYCOPYRROLATE 0.4 MG/2ML SOLUTION: Performed by: NURSE ANESTHETIST, CERTIFIED REGISTERED

## 2024-07-25 PROCEDURE — 25010000002 MEPERIDINE PER 100 MG: Performed by: NURSE ANESTHETIST, CERTIFIED REGISTERED

## 2024-07-25 PROCEDURE — 25010000002 DEXAMETHASONE PER 1 MG: Performed by: ANESTHESIOLOGY

## 2024-07-25 PROCEDURE — 25010000002 CEFAZOLIN PER 500 MG

## 2024-07-25 PROCEDURE — 25010000002 INDOCYANINE GREEN 25 MG RECONSTITUTED SOLUTION: Performed by: SURGERY

## 2024-07-25 PROCEDURE — 25810000003 LACTATED RINGERS PER 1000 ML: Performed by: NURSE ANESTHETIST, CERTIFIED REGISTERED

## 2024-07-25 PROCEDURE — 25010000002 KETOROLAC TROMETHAMINE PER 15 MG: Performed by: NURSE ANESTHETIST, CERTIFIED REGISTERED

## 2024-07-25 PROCEDURE — 25010000002 NEOSTIGMINE 10 MG/10ML SOLUTION: Performed by: NURSE ANESTHETIST, CERTIFIED REGISTERED

## 2024-07-25 PROCEDURE — S2900 ROBOTIC SURGICAL SYSTEM: HCPCS | Performed by: SURGERY

## 2024-07-25 PROCEDURE — 25010000002 MIDAZOLAM PER 1 MG: Performed by: NURSE ANESTHETIST, CERTIFIED REGISTERED

## 2024-07-25 PROCEDURE — 25810000003 LACTATED RINGERS PER 1000 ML: Performed by: ANESTHESIOLOGY

## 2024-07-25 PROCEDURE — 25010000002 ONDANSETRON PER 1 MG: Performed by: NURSE ANESTHETIST, CERTIFIED REGISTERED

## 2024-07-25 PROCEDURE — 25010000002 FENTANYL CITRATE (PF) 50 MCG/ML SOLUTION: Performed by: NURSE ANESTHETIST, CERTIFIED REGISTERED

## 2024-07-25 PROCEDURE — 47563 LAPARO CHOLECYSTECTOMY/GRAPH: CPT | Performed by: SURGERY

## 2024-07-25 PROCEDURE — 25010000002 ROPIVACAINE PER 1 MG: Performed by: ANESTHESIOLOGY

## 2024-07-25 DEVICE — HEMOLOK L 6 CLIPS/CART
Type: IMPLANTABLE DEVICE | Site: ABDOMEN | Status: FUNCTIONAL
Brand: WECK

## 2024-07-25 RX ORDER — LIDOCAINE HYDROCHLORIDE 20 MG/ML
INJECTION, SOLUTION EPIDURAL; INFILTRATION; INTRACAUDAL; PERINEURAL AS NEEDED
Status: DISCONTINUED | OUTPATIENT
Start: 2024-07-25 | End: 2024-07-25 | Stop reason: SURG

## 2024-07-25 RX ORDER — ROPIVACAINE HYDROCHLORIDE 5 MG/ML
INJECTION, SOLUTION EPIDURAL; INFILTRATION; PERINEURAL
Status: COMPLETED | OUTPATIENT
Start: 2024-07-25 | End: 2024-07-25

## 2024-07-25 RX ORDER — IBUPROFEN 600 MG/1
600 TABLET ORAL EVERY 6 HOURS PRN
Qty: 30 TABLET | Refills: 0 | Status: SHIPPED | OUTPATIENT
Start: 2024-07-25

## 2024-07-25 RX ORDER — SODIUM CHLORIDE 0.9 % (FLUSH) 0.9 %
10 SYRINGE (ML) INJECTION EVERY 12 HOURS SCHEDULED
Status: DISCONTINUED | OUTPATIENT
Start: 2024-07-25 | End: 2024-07-25 | Stop reason: HOSPADM

## 2024-07-25 RX ORDER — SODIUM CHLORIDE, SODIUM LACTATE, POTASSIUM CHLORIDE, CALCIUM CHLORIDE 600; 310; 30; 20 MG/100ML; MG/100ML; MG/100ML; MG/100ML
INJECTION, SOLUTION INTRAVENOUS CONTINUOUS PRN
Status: DISCONTINUED | OUTPATIENT
Start: 2024-07-25 | End: 2024-07-25 | Stop reason: SURG

## 2024-07-25 RX ORDER — ONDANSETRON 2 MG/ML
4 INJECTION INTRAMUSCULAR; INTRAVENOUS AS NEEDED
Status: DISCONTINUED | OUTPATIENT
Start: 2024-07-25 | End: 2024-07-25 | Stop reason: HOSPADM

## 2024-07-25 RX ORDER — FENTANYL CITRATE 50 UG/ML
50 INJECTION, SOLUTION INTRAMUSCULAR; INTRAVENOUS
Status: DISCONTINUED | OUTPATIENT
Start: 2024-07-25 | End: 2024-07-25 | Stop reason: HOSPADM

## 2024-07-25 RX ORDER — GLYCOPYRROLATE 0.2 MG/ML
INJECTION INTRAMUSCULAR; INTRAVENOUS AS NEEDED
Status: DISCONTINUED | OUTPATIENT
Start: 2024-07-25 | End: 2024-07-25 | Stop reason: SURG

## 2024-07-25 RX ORDER — TRAMADOL HYDROCHLORIDE 50 MG/1
50 TABLET ORAL EVERY 8 HOURS PRN
Qty: 12 TABLET | Refills: 0 | Status: SHIPPED | OUTPATIENT
Start: 2024-07-25

## 2024-07-25 RX ORDER — ONDANSETRON 2 MG/ML
INJECTION INTRAMUSCULAR; INTRAVENOUS AS NEEDED
Status: DISCONTINUED | OUTPATIENT
Start: 2024-07-25 | End: 2024-07-25 | Stop reason: SURG

## 2024-07-25 RX ORDER — SODIUM CHLORIDE 9 MG/ML
40 INJECTION, SOLUTION INTRAVENOUS AS NEEDED
Status: DISCONTINUED | OUTPATIENT
Start: 2024-07-25 | End: 2024-07-25 | Stop reason: HOSPADM

## 2024-07-25 RX ORDER — INDOCYANINE GREEN AND WATER 25 MG
7.5 KIT INJECTION ONCE
Status: DISCONTINUED | OUTPATIENT
Start: 2024-07-25 | End: 2024-07-25

## 2024-07-25 RX ORDER — FENTANYL CITRATE 50 UG/ML
INJECTION, SOLUTION INTRAMUSCULAR; INTRAVENOUS AS NEEDED
Status: DISCONTINUED | OUTPATIENT
Start: 2024-07-25 | End: 2024-07-25 | Stop reason: SURG

## 2024-07-25 RX ORDER — SODIUM CHLORIDE 0.9 % (FLUSH) 0.9 %
3 SYRINGE (ML) INJECTION EVERY 12 HOURS SCHEDULED
Status: DISCONTINUED | OUTPATIENT
Start: 2024-07-25 | End: 2024-07-25 | Stop reason: HOSPADM

## 2024-07-25 RX ORDER — SODIUM CHLORIDE 0.9 % (FLUSH) 0.9 %
10 SYRINGE (ML) INJECTION AS NEEDED
Status: DISCONTINUED | OUTPATIENT
Start: 2024-07-25 | End: 2024-07-25 | Stop reason: HOSPADM

## 2024-07-25 RX ORDER — MIDAZOLAM HYDROCHLORIDE 1 MG/ML
1 INJECTION INTRAMUSCULAR; INTRAVENOUS
Status: DISCONTINUED | OUTPATIENT
Start: 2024-07-25 | End: 2024-07-25 | Stop reason: HOSPADM

## 2024-07-25 RX ORDER — ACETAMINOPHEN 325 MG/1
650 TABLET ORAL EVERY 4 HOURS PRN
Qty: 30 TABLET | Refills: 0 | Status: SHIPPED | OUTPATIENT
Start: 2024-07-25

## 2024-07-25 RX ORDER — NEOSTIGMINE METHYLSULFATE 1 MG/ML
INJECTION INTRAVENOUS AS NEEDED
Status: DISCONTINUED | OUTPATIENT
Start: 2024-07-25 | End: 2024-07-25 | Stop reason: SURG

## 2024-07-25 RX ORDER — FAMOTIDINE 10 MG/ML
INJECTION, SOLUTION INTRAVENOUS AS NEEDED
Status: DISCONTINUED | OUTPATIENT
Start: 2024-07-25 | End: 2024-07-25 | Stop reason: SURG

## 2024-07-25 RX ORDER — IPRATROPIUM BROMIDE AND ALBUTEROL SULFATE 2.5; .5 MG/3ML; MG/3ML
3 SOLUTION RESPIRATORY (INHALATION) ONCE AS NEEDED
Status: DISCONTINUED | OUTPATIENT
Start: 2024-07-25 | End: 2024-07-25 | Stop reason: HOSPADM

## 2024-07-25 RX ORDER — DEXAMETHASONE SODIUM PHOSPHATE 4 MG/ML
INJECTION, SOLUTION INTRA-ARTICULAR; INTRALESIONAL; INTRAMUSCULAR; INTRAVENOUS; SOFT TISSUE
Status: COMPLETED | OUTPATIENT
Start: 2024-07-25 | End: 2024-07-25

## 2024-07-25 RX ORDER — KETOROLAC TROMETHAMINE 30 MG/ML
INJECTION, SOLUTION INTRAMUSCULAR; INTRAVENOUS AS NEEDED
Status: DISCONTINUED | OUTPATIENT
Start: 2024-07-25 | End: 2024-07-25 | Stop reason: SURG

## 2024-07-25 RX ORDER — MEPERIDINE HYDROCHLORIDE 25 MG/ML
12.5 INJECTION INTRAMUSCULAR; INTRAVENOUS; SUBCUTANEOUS
Status: COMPLETED | OUTPATIENT
Start: 2024-07-25 | End: 2024-07-25

## 2024-07-25 RX ORDER — SODIUM CHLORIDE, SODIUM LACTATE, POTASSIUM CHLORIDE, CALCIUM CHLORIDE 600; 310; 30; 20 MG/100ML; MG/100ML; MG/100ML; MG/100ML
125 INJECTION, SOLUTION INTRAVENOUS ONCE
Status: COMPLETED | OUTPATIENT
Start: 2024-07-25 | End: 2024-07-25

## 2024-07-25 RX ORDER — ACETAMINOPHEN 325 MG/1
650 TABLET ORAL ONCE
Status: COMPLETED | OUTPATIENT
Start: 2024-07-25 | End: 2024-07-25

## 2024-07-25 RX ORDER — INDOCYANINE GREEN AND WATER 25 MG
2.5 KIT INJECTION ONCE
Status: COMPLETED | OUTPATIENT
Start: 2024-07-25 | End: 2024-07-25

## 2024-07-25 RX ORDER — OXYCODONE HYDROCHLORIDE AND ACETAMINOPHEN 5; 325 MG/1; MG/1
1 TABLET ORAL ONCE AS NEEDED
Status: DISCONTINUED | OUTPATIENT
Start: 2024-07-25 | End: 2024-07-25 | Stop reason: HOSPADM

## 2024-07-25 RX ORDER — IBUPROFEN 800 MG/1
800 TABLET ORAL EVERY 6 HOURS PRN
COMMUNITY

## 2024-07-25 RX ORDER — ROCURONIUM BROMIDE 10 MG/ML
INJECTION, SOLUTION INTRAVENOUS AS NEEDED
Status: DISCONTINUED | OUTPATIENT
Start: 2024-07-25 | End: 2024-07-25 | Stop reason: SURG

## 2024-07-25 RX ORDER — MIDAZOLAM HYDROCHLORIDE 1 MG/ML
INJECTION INTRAMUSCULAR; INTRAVENOUS AS NEEDED
Status: DISCONTINUED | OUTPATIENT
Start: 2024-07-25 | End: 2024-07-25 | Stop reason: SURG

## 2024-07-25 RX ORDER — SODIUM CHLORIDE, SODIUM LACTATE, POTASSIUM CHLORIDE, CALCIUM CHLORIDE 600; 310; 30; 20 MG/100ML; MG/100ML; MG/100ML; MG/100ML
100 INJECTION, SOLUTION INTRAVENOUS ONCE AS NEEDED
Status: DISCONTINUED | OUTPATIENT
Start: 2024-07-25 | End: 2024-07-25 | Stop reason: HOSPADM

## 2024-07-25 RX ORDER — PROPOFOL 10 MG/ML
INJECTION, EMULSION INTRAVENOUS AS NEEDED
Status: DISCONTINUED | OUTPATIENT
Start: 2024-07-25 | End: 2024-07-25 | Stop reason: SURG

## 2024-07-25 RX ADMIN — MIDAZOLAM HYDROCHLORIDE 2 MG: 1 INJECTION, SOLUTION INTRAMUSCULAR; INTRAVENOUS at 10:31

## 2024-07-25 RX ADMIN — MEPERIDINE HYDROCHLORIDE 12.5 MG: 25 INJECTION INTRAMUSCULAR; INTRAVENOUS; SUBCUTANEOUS at 12:14

## 2024-07-25 RX ADMIN — SODIUM CHLORIDE, POTASSIUM CHLORIDE, SODIUM LACTATE AND CALCIUM CHLORIDE 125 ML/HR: 600; 310; 30; 20 INJECTION, SOLUTION INTRAVENOUS at 09:43

## 2024-07-25 RX ADMIN — SODIUM CHLORIDE, POTASSIUM CHLORIDE, SODIUM LACTATE AND CALCIUM CHLORIDE: 600; 310; 30; 20 INJECTION, SOLUTION INTRAVENOUS at 11:05

## 2024-07-25 RX ADMIN — FENTANYL CITRATE 50 MCG: 50 INJECTION INTRAMUSCULAR; INTRAVENOUS at 10:31

## 2024-07-25 RX ADMIN — CEFAZOLIN 2 G: 2 INJECTION, POWDER, FOR SOLUTION INTRAMUSCULAR; INTRAVENOUS at 10:31

## 2024-07-25 RX ADMIN — INDOCYANINE GREEN AND WATER 2.5 MG: KIT at 09:45

## 2024-07-25 RX ADMIN — FAMOTIDINE 20 MG: 10 INJECTION, SOLUTION INTRAVENOUS at 10:31

## 2024-07-25 RX ADMIN — SODIUM CHLORIDE, POTASSIUM CHLORIDE, SODIUM LACTATE AND CALCIUM CHLORIDE: 600; 310; 30; 20 INJECTION, SOLUTION INTRAVENOUS at 10:31

## 2024-07-25 RX ADMIN — DEXAMETHASONE SODIUM PHOSPHATE 8 MG: 4 INJECTION, SOLUTION INTRA-ARTICULAR; INTRALESIONAL; INTRAMUSCULAR; INTRAVENOUS; SOFT TISSUE at 10:46

## 2024-07-25 RX ADMIN — ROCURONIUM BROMIDE 40 MG: 10 SOLUTION INTRAVENOUS at 10:37

## 2024-07-25 RX ADMIN — PROPOFOL 170 MG: 10 INJECTION, EMULSION INTRAVENOUS at 10:37

## 2024-07-25 RX ADMIN — ONDANSETRON 4 MG: 2 INJECTION INTRAMUSCULAR; INTRAVENOUS at 10:31

## 2024-07-25 RX ADMIN — KETOROLAC TROMETHAMINE 30 MG: 30 INJECTION, SOLUTION INTRAMUSCULAR; INTRAVENOUS at 10:41

## 2024-07-25 RX ADMIN — LIDOCAINE HYDROCHLORIDE 100 MG: 20 INJECTION, SOLUTION EPIDURAL; INFILTRATION; INTRACAUDAL; PERINEURAL at 10:37

## 2024-07-25 RX ADMIN — ACETAMINOPHEN 650 MG: 325 TABLET ORAL at 09:43

## 2024-07-25 RX ADMIN — NEOSTIGMINE METHYLSULFATE 3 MG: 0.5 INJECTION INTRAVENOUS at 11:27

## 2024-07-25 RX ADMIN — ROPIVACAINE HYDROCHLORIDE 200 MG: 5 INJECTION, SOLUTION EPIDURAL; INFILTRATION; PERINEURAL at 10:46

## 2024-07-25 RX ADMIN — MEPERIDINE HYDROCHLORIDE 12.5 MG: 25 INJECTION INTRAMUSCULAR; INTRAVENOUS; SUBCUTANEOUS at 12:09

## 2024-07-25 RX ADMIN — GLYCOPYRROLATE 0.4 MG: 0.2 INJECTION INTRAMUSCULAR; INTRAVENOUS at 11:27

## 2024-07-25 NOTE — ANESTHESIA PROCEDURE NOTES
Airway  Urgency: elective    Date/Time: 7/25/2024 10:39 AM  Airway not difficult    General Information and Staff    Patient location during procedure: OR  CRNA/CAA: Elizabeth Chester CRNA    Indications and Patient Condition  Indications for airway management: airway protection    Preoxygenated: yes  MILS maintained throughout  Mask difficulty assessment: 1 - vent by mask    Final Airway Details  Final airway type: endotracheal airway      Successful airway: ETT  Cuffed: yes   Successful intubation technique: direct laryngoscopy  Facilitating devices/methods: intubating stylet and cricoid pressure  Endotracheal tube insertion site: oral  Blade: David  Blade size: 4  ETT size (mm): 7.0  Cormack-Lehane Classification: grade I - full view of glottis  Placement verified by: chest auscultation and capnometry   Measured from: lips  ETT/EBT  to lips (cm): 23  Number of attempts at approach: 2  Assessment: lips, teeth, and gum same as pre-op and atraumatic intubation    Additional Comments  Grade one view, but unable to pass 7.5 through vocal chords. 7.0 tube used without difficulty

## 2024-07-25 NOTE — OP NOTE
CHOLECYSTECTOMY LAPAROSCOPIC WITH DAVINCI ROBOT  Procedure Note    Luke Wadsworth  7/25/2024    Pre-op Diagnosis:   RUQ pain [R10.11]  Umbilical hernia without obstruction and without gangrene [K42.9]    Post-op Diagnosis:     Post-Op Diagnosis Codes:     * RUQ pain [R10.11]     * Umbilical hernia without obstruction and without gangrene [K42.9]    Procedure(s):  CHOLECYSTECTOMY LAPAROSCOPIC WITH DAVINCI ROBOT  UMBILICAL HERNIA REPAIR LAPAROSCOPIC WITH DAVINCI ROBOT    Surgeon(s):  Brice Vargas MD House, Aaron Bradley, MD    Anesthesia: General    Staff:   Circulator: Pili Madison RN  Scrub Person: Paul Santana  Assistant: Dilia Velarde    Findings: Distended gallbladder, critical view of safety obtained, small umbilical hernia repaired primarily.    Operative Procedure: The patient was taken to the operating suite and placed supine on operating table.  Bilateral sequential compression devices were applied and general endotracheal anesthesia administered.  The abdomen was prepped and draped in usual sterile fashion.  Preoperative antibiotics were confirmed.  Timeout procedure was performed.  A Veress needle was inserted at Clarke's point and saline drop test confirmed entry into the peritoneal space.  Pneumoperitoneum was established.  An 8 mm Optiview trocar was inserted through an infraumbilical incision.  The laparoscope was inserted and the Veress needle site was free of injury.  The Veress needle site was removed and upsized to an 8 mm robotic trocar.  A second 8 mm robotic trocar was placed in the anterior axillary line at the level of the umbilicus of the right abdomen.  A 5 mm Optiview trocar was then placed as far lateral as possible in the right abdomen for an assistant trocar.  At this time the robot was docked to the trocars and the robotic camera inserted.  The patient had been placed in reverse Trendelenburg with left lateral tilt prior to docking.  I then exited the  sterile field and entered the robotic console.  My assistant placed a robotic hook in the right arm #1 and a fenestrated bipolar in the left arm #2.  My assistant grasped the fundus of the gallbladder and retracted anteriorly and superiorly.  The gallbladder was distended.  Mild adhesions to the fundus and infundibulum were taken down with cautery hook.  The infundibulum was exposed and grasped and retracted laterally and inferiorly.  The peritoneum on the anterior posterior surface of the gallbladder was opened with the cautery hook.  The gallbladder was elevated from the gallbladder fossa for a portion and the cystic duct and artery were identified and dissected free circumferentially.  All adventitial tissue between the cystic duct and artery was dissected free with the cautery hook.  The cystic plate was visible from the anterior and posterior views with only the cystic duct and artery seen entering the gallbladder.  With the critical view of safety obtained Firefly confirmed no abnormal ductal abnormality and at this time the cystic artery was controlled with a single Hemoclip placed proximally on the artery and the cystic duct controlled with 2 hemoclips placed on the cystic duct stump side and one on the infundibular side of the duct. The artery was divided with the cautery hook with no evidence of bleeding.  The cystic duct was divided with the cut mode of the cautery hook with no evidence of cystic duct stump leak.  The gallbladder was then removed from the gallbladder fossa intact.  This was done with the cautery hook.  Firefly was used to confirm no evidence of duct of Luschka or accessory duct leakage.  There was no cystic duct stump leakage.  At this time robotic instruments were removed under direct visualization.  The robot was undocked and I entered the sterile field for completion of the case.  A 5 mm laparoscope was inserted through a robotic trocar and the gallbladder was placed in a 5 mm Endo  Catch bag. It was removed through the infraumbilical fascial defect.  The gallbladder fossa was hemostatic and at this time all trocars were removed under direct visualization and pneumoperitoneum was evacuated.  The infraumbilical fascial defect had been made through the hernia and circumferential control the umbilical stalk with a hemostat was achieved with blunt dissection.  The umbilical stalk was divided from the hernia sac and elevated without damaging the umbilical dermis.  The small 1 cm hernia defect was then closed with interrupted Nurolon suture and the umbilical dermis was tacked back down to the repair to create an inward oriented umbilicus with Vicryl suture.  This incision was closed with deep dermal Vicryl and Monocryl subcuticular skin approximation.  A cottonball and Tegaderm pressure dressing was applied and the remaining incisions were closed with Monocryl suture and skin affix.  Patient tolerated procedure well.     Estimated Blood Loss: 5 mL     Specimens:   Gallbladder           Drains: None    Grafts/Implants: None    Complications: None      Brice Vargas MD     Date: 7/25/2024  Time: 13:12 EDT

## 2024-07-25 NOTE — ANESTHESIA PREPROCEDURE EVALUATION
Anesthesia Evaluation     no history of anesthetic complications:   NPO Solid Status: > 8 hours  NPO Liquid Status: > 8 hours           Airway   Mallampati: II  TM distance: >3 FB  Neck ROM: full  No difficulty expected  Dental - normal exam     Pulmonary - normal exam   (+) a smoker Current, asthma,  Cardiovascular - normal exam        Neuro/Psych  GI/Hepatic/Renal/Endo    (+) GERD, renal disease- stones    Musculoskeletal     Abdominal  - normal exam   Substance History      OB/GYN          Other                          Anesthesia Plan    ASA 2     general with block       Anesthetic plan, risks, benefits, and alternatives have been provided, discussed and informed consent has been obtained with: patient.        CODE STATUS:

## 2024-07-25 NOTE — ANESTHESIA POSTPROCEDURE EVALUATION
Patient: Luke Wadsworth    Procedure Summary       Date: 07/25/24 Room / Location: [facility] OR [address] [place] [address] COR OR    Anesthesia Start: 1031 Anesthesia Stop: 1143    Procedures:       CHOLECYSTECTOMY LAPAROSCOPIC WITH DAVINCI ROBOT (Abdomen)      UMBILICAL HERNIA REPAIR LAPAROSCOPIC WITH DAVINCI ROBOT (Abdomen) Diagnosis:       RUQ pain      Umbilical hernia without obstruction and without gangrene      (RUQ pain [R10.11])      (Umbilical hernia without obstruction and without gangrene [K42.9])    Surgeons: Brice Vargas MD Provider: Kilo He MD    Anesthesia Type: general with block ASA Status: 2            Anesthesia Type: general with block    Vitals  Vitals Value Taken Time   /89 07/25/24 1215   Temp 97 °F (36.1 °C) 07/25/24 1145   Pulse 87 07/25/24 1216   Resp 15 07/25/24 1215   SpO2 97 % 07/25/24 1216   Vitals shown include unfiled device data.        Post Anesthesia Care and Evaluation    Patient location during evaluation: PHASE II  Patient participation: complete - patient participated  Level of consciousness: awake and alert  Pain score: 1  Pain management: adequate    Airway patency: patent  Anesthetic complications: No anesthetic complications  PONV Status: controlled  Cardiovascular status: acceptable  Respiratory status: acceptable  Hydration status: acceptable

## 2024-07-25 NOTE — ANESTHESIA PROCEDURE NOTES
"Peripheral Block      Patient reassessed immediately prior to procedure    Patient location during procedure: OR  Start time: 7/25/2024 10:44 AM  Stop time: 7/25/2024 10:46 AM  Reason for block: at surgeon's request and post-op pain management  Performed by  CRNA/CAA: Elizabeth Chester CRNA  Preanesthetic Checklist  Completed: patient identified, IV checked, site marked, risks and benefits discussed, surgical consent, monitors and equipment checked, pre-op evaluation and timeout performed  Prep:  Pt Position: supine  Sterile barriers:cap, gloves, sterile barriers and mask  Prep: ChloraPrep  Patient monitoring: blood pressure monitoring, continuous pulse oximetry and EKG  Procedure    Nursing cardiac assessment comments yes: Sedation, GA, Spinal,Epidural   Performed under: general  Guidance:ultrasound guided    ULTRASOUND INTERPRETATION.  Using ultrasound guidance a 20 G (20g 4\" Stimuplex) gauge needle was placed in close proximity to the nerve, at which point, under ultrasound guidance anesthetic was injected in the area of the nerve and spread of the anesthesia was seen on ultrasound in close proximity thereto.  There were no abnormalities seen on ultrasound; a digital image was taken; and the patient tolerated the procedure with no complications. Images:still images obtained    Laterality:Bilateral  Block Type:TAP  Injection Technique:single-shot  Needle Type:short-bevel  Needle Gauge:20 G  Resistance on Injection: none    Medications Used: dexamethasone (DECADRON) injection - Peripheral Nerve, Transversus Abdominus Plane   8 mg - 7/25/2024 10:46:00 AM  ropivacaine (NAROPIN) injection 0.5 % - Peripheral Nerve, Transversus Abdominus Plane   200 mg - 7/25/2024 10:46:00 AM      Medications  Comment:Block Injection:  Total volume divided equally between all 4 injection sites      Post Assessment  Injection Assessment: negative aspiration for heme, incremental injection and no paresthesia on injection  Patient " Tolerance:comfortable throughout block  Complications:no  Additional Notes  The pt was in the supine position under general anesthesia    Under Ultrasound guidance, a BBraun 4inch 360 degree needle was advanced with Normal Saline hydro dissection of tissue.  The Internal Oblique and Transversus Abdominus muscles where visualized.  At or before the aponeurosis of Internal Oblique, local anesthetic spread was visualized in the Transversus Abdominus Plane. Injection was made incrementally with aspiration every 5 mls.  There was no  intravascular injection,  injection pressure was normal, there was no neural injection, and the procedure was completed without difficulty. The same procedure was completed for left and right sided lateral tap blocks.    Under Ultrasound guidance, a Persaud 4inch 360 degree needle was advanced with Normal Saline hydro dissection of tissue.  The Rectus and Transversus Abdominus muscles where visualized.  The needle tip was placed between the Transversus Abdominus and rectus abdominus, local anesthetic spread was visualized in the Transversus Abdominus Plane. Injection was made incrementally with aspiration every 5 mls.  There was no  intravascular injection,  injection pressure was normal, there was no neural injection, and the procedure was completed without difficulty. The same procedure was completed for left and right sided subcostal tap blocks. Thank You.      Performed by: Elizabeth Chester CRNA

## 2024-07-29 LAB — REF LAB TEST METHOD: NORMAL

## 2024-08-13 ENCOUNTER — OFFICE VISIT (OUTPATIENT)
Dept: SURGERY | Facility: CLINIC | Age: 29
End: 2024-08-13
Payer: COMMERCIAL

## 2024-08-13 VITALS — WEIGHT: 144 LBS | HEIGHT: 69 IN | BODY MASS INDEX: 21.33 KG/M2

## 2024-08-13 DIAGNOSIS — R10.11 RUQ PAIN: Primary | ICD-10-CM

## 2024-08-13 DIAGNOSIS — K42.9 UMBILICAL HERNIA WITHOUT OBSTRUCTION AND WITHOUT GANGRENE: ICD-10-CM

## 2024-08-13 PROCEDURE — 99024 POSTOP FOLLOW-UP VISIT: CPT | Performed by: SURGERY

## 2024-08-13 PROCEDURE — 1159F MED LIST DOCD IN RCRD: CPT | Performed by: SURGERY

## 2024-08-13 PROCEDURE — 1160F RVW MEDS BY RX/DR IN RCRD: CPT | Performed by: SURGERY

## 2024-08-13 RX ORDER — SODIUM, POTASSIUM,MAG SULFATES 17.5-3.13G
1 SOLUTION, RECONSTITUTED, ORAL ORAL EVERY 12 HOURS
Qty: 354 ML | Refills: 0 | Status: SHIPPED | OUTPATIENT
Start: 2024-08-13

## 2024-08-13 NOTE — H&P (VIEW-ONLY)
Subjective   Luke Wadsworth is a 29 y.o. male who presents today for Initial Evaluation    Chief Complaint:    Chief Complaint   Patient presents with    Post-op        History of Present Illness:    History of Present Illness Luke is a 28-year-old male presents for an evaluation for right upper quadrant pain.  He recently underwent cholecystectomy with primary umbilical hernia repair.  He is doing well and his incisions are healing without issue.  Patient does state he has a family history of colon cancer and his father had colon cancer in his late 30s and he is due to initiate surveillance colonoscopy for his family history.  He currently has an aunt also dealing with colon cancer.    The following portions of the patient's history were reviewed and updated as appropriate: allergies, current medications, past family history, past medical history, past social history, past surgical history and problem list.    Past Medical History:  Past Medical History:   Diagnosis Date    Abdominal pain     Asthma     GERD (gastroesophageal reflux disease)     Kidney stone     Nausea and vomiting     Umbilical hernia        Social History:  Social History     Socioeconomic History    Marital status:    Tobacco Use    Smoking status: Every Day     Current packs/day: 0.25     Average packs/day: 0.3 packs/day for 0.4 years (0.1 ttl pk-yrs)     Types: Cigarettes     Start date: 4/7/2024     Passive exposure: Current    Smokeless tobacco: Current     Types: Chew, Snuff   Vaping Use    Vaping status: Every Day    Substances: Nicotine, THC, Flavoring    Devices: Disposable   Substance and Sexual Activity    Alcohol use: Yes     Alcohol/week: 20.0 standard drinks of alcohol     Types: 20 Cans of beer per week     Comment: WEEKLY    Drug use: Never    Sexual activity: Defer     Partners: Female     Birth control/protection: None       Family History:  History reviewed. No pertinent family history.    Past Surgical  History:  Past Surgical History:   Procedure Laterality Date    CHOLECYSTECTOMY N/A 7/25/2024    Procedure: CHOLECYSTECTOMY LAPAROSCOPIC WITH DAVINCI ROBOT;  Surgeon: Brice Vargas MD;  Location: [facility] OR;  Service: Robotics - CHEQROOMinci;  Laterality: N/A;    CLAVICLE SURGERY Right     TONSILLECTOMY AND ADENOIDECTOMY      UMBILICAL HERNIA REPAIR N/A 7/25/2024    Procedure: UMBILICAL HERNIA REPAIR LAPAROSCOPIC WITH DAVINCI ROBOT;  Surgeon: Brice Vargas MD;  Location: [facility] OR;  Service: Robotics - CHEQROOMinci;  Laterality: N/A;       Problem List:  Patient Active Problem List   Diagnosis    RUQ pain    Umbilical hernia without obstruction and without gangrene       Allergy:   No Known Allergies     Current Medications:   Current Outpatient Medications   Medication Sig Dispense Refill    albuterol sulfate  (90 Base) MCG/ACT inhaler Inhale 2 puffs Every 4 (Four) Hours As Needed for Wheezing.      acetaminophen (TYLENOL) 325 MG tablet Take 2 tablets by mouth Every 4 (Four) Hours As Needed for Mild Pain. (Patient not taking: Reported on 8/13/2024) 30 tablet 0    ibuprofen (ADVIL,MOTRIN) 600 MG tablet Take 1 tablet by mouth Every 6 (Six) Hours As Needed for Mild Pain. (Patient not taking: Reported on 8/13/2024) 30 tablet 0    ibuprofen (ADVIL,MOTRIN) 800 MG tablet Take 1 tablet by mouth Every 6 (Six) Hours As Needed for Mild Pain. (Patient not taking: Reported on 8/13/2024)      traMADol (ULTRAM) 50 MG tablet Take 1 tablet by mouth Every 8 (Eight) Hours As Needed for Moderate Pain. (Patient not taking: Reported on 8/13/2024) 12 tablet 0     No current facility-administered medications for this visit.       Review of Systems:    Review of Systems   Gastrointestinal:  Positive for abdominal pain, diarrhea, nausea and vomiting.         Physical Exam:   Physical Exam  Constitutional:       Appearance: Normal appearance.   HENT:      Head: Normocephalic and atraumatic.      Right Ear: External ear normal.     "  Left Ear: External ear normal.   Eyes:      Conjunctiva/sclera: Conjunctivae normal.   Pulmonary:      Effort: Pulmonary effort is normal.   Abdominal:      General: Abdomen is flat.   Musculoskeletal:         General: Normal range of motion.      Cervical back: Normal range of motion and neck supple.   Skin:     General: Skin is warm.      Capillary Refill: Capillary refill takes less than 2 seconds.   Neurological:      General: No focal deficit present.      Mental Status: He is alert and oriented to person, place, and time.   Psychiatric:         Mood and Affect: Mood normal.         Behavior: Behavior normal.         Vitals:  Height 175.3 cm (69\"), weight 65.3 kg (144 lb).   Body mass index is 21.27 kg/m².     Lab Results:   Admission on 2024, Discharged on 2024   Component Date Value Ref Range Status    Reference Lab Report 2024    Final                    Value:[facility]  [address] [place] [address]  Phone: [phone] or [phone]  Fax: [phone]  Jacinto Young M.D., Medical Director    PATIENT NAME                           LABORATORY NO.  [id number]  [address]                    [id number]  [id number]                         AGE              SEX  SSN           CLIENT REF #  Psychiatric RK              29      1995      [id number]   [id number]    [address]                     REQUESTING M.D.     ATTENDING [place] [address]                   DON MARTINES  DATE COLLECTED      DATE RECEIVED      DATE REPORTED  2024    DIAGNOSIS:  GALLBLADDER:  Benign gallbladder with scant chronic inflammation    CAM    CLINICAL HISTORY:  Right upper quadrant pain, umbilical hernia without obstruction and without  gangrene    SPECIMENS RECEIVED:  GALLBLADDER    MICROSCOPIC DESCRIPTION:  Tissue blocks are prepared and slides                           are examined " microscopically. See  diagnosis for details.    Professional interpretation rendered by Rosa Duque M.D., GUILHERME at  [facility] [employer], [address] [place] [id number].    GROSS DESCRIPTION:  Received in formalin labeled gallbladder is a 7.7 x 3.0 x 2.7 cm intact  gallbladder with an open cystic duct that is patent. The serosa is tan-pink,  smooth, and glistening. The hepatic surface is tan-red and shaggy.  The lumen  contains an abundant amount of green viscous bile with no calculi present.  The  mucosa is bile-stained and velvety. The gallbladder wall has an average wall  thickness of 0.1 cm. No pericystic duct lymph node is identified. No mucosal  polyps or other lesions are present.  Representative sections of the gallbladder  wall and the entire en face cystic duct margin are submitted in 1 cassette labeled  A1.    [facility]    REVIEWED, DIAGNOSED AND ELECTRONICALLY  SIGNED BY:    Rosa Duque M.D., GUILHERME  CPT CODES:  [id number]     Pre-Admission Testing on 07/23/2024   Component Date Value Ref Range Status    Glucose 07/23/2024 72  65 - 99 mg/dL Final    BUN 07/23/2024 10  6 - 20 mg/dL Final    Creatinine 07/23/2024 0.88  0.76 - 1.27 mg/dL Final    Sodium 07/23/2024 141  136 - 145 mmol/L Final    Potassium 07/23/2024 4.0  3.5 - 5.2 mmol/L Final    Chloride 07/23/2024 104  98 - 107 mmol/L Final    CO2 07/23/2024 25.5  22.0 - 29.0 mmol/L Final    Calcium 07/23/2024 9.8  8.6 - 10.5 mg/dL Final    BUN/Creatinine Ratio 07/23/2024 11.4  7.0 - 25.0 Final    Anion Gap 07/23/2024 11.5  5.0 - 15.0 mmol/L Final    eGFR 07/23/2024 120.9  >60.0 mL/min/1.73 Final    WBC 07/23/2024 5.26  3.40 - 10.80 10*3/mm3 Final    RBC 07/23/2024 5.32  4.14 - 5.80 10*6/mm3 Final    Hemoglobin 07/23/2024 15.0  13.0 - 17.7 g/dL Final    Hematocrit 07/23/2024 45.7  37.5 - 51.0 % Final    MCV 07/23/2024 85.9  79.0 - 97.0 fL Final    MCH 07/23/2024 28.2  26.6 - 33.0 pg Final    MCHC 07/23/2024 32.8  31.5 - 35.7 g/dL Final    RDW  07/23/2024 12.4  12.3 - 15.4 % Final    RDW-SD 07/23/2024 38.5  37.0 - 54.0 fl Final    MPV 07/23/2024 10.3  6.0 - 12.0 fL Final    Platelets 07/23/2024 328  140 - 450 10*3/mm3 Final   Admission on 06/15/2024, Discharged on 06/15/2024   Component Date Value Ref Range Status    Glucose 06/15/2024 100 (H)  65 - 99 mg/dL Final    BUN 06/15/2024 11  6 - 20 mg/dL Final    Creatinine 06/15/2024 1.37 (H)  0.76 - 1.27 mg/dL Final    Sodium 06/15/2024 141  136 - 145 mmol/L Final    Potassium 06/15/2024 3.9  3.5 - 5.2 mmol/L Final    Chloride 06/15/2024 103  98 - 107 mmol/L Final    CO2 06/15/2024 25.8  22.0 - 29.0 mmol/L Final    Calcium 06/15/2024 10.3  8.6 - 10.5 mg/dL Final    Total Protein 06/15/2024 8.1  6.0 - 8.5 g/dL Final    Albumin 06/15/2024 4.9  3.5 - 5.2 g/dL Final    ALT (SGPT) 06/15/2024 16  1 - 41 U/L Final    AST (SGOT) 06/15/2024 24  1 - 40 U/L Final    Alkaline Phosphatase 06/15/2024 129 (H)  39 - 117 U/L Final    Total Bilirubin 06/15/2024 0.9  0.0 - 1.2 mg/dL Final    Globulin 06/15/2024 3.2  gm/dL Final    A/G Ratio 06/15/2024 1.5  g/dL Final    BUN/Creatinine Ratio 06/15/2024 8.0  7.0 - 25.0 Final    Anion Gap 06/15/2024 12.2  5.0 - 15.0 mmol/L Final    eGFR 06/15/2024 73.0  >60.0 mL/min/1.73 Final    Color, UA 06/15/2024 Yellow  Yellow, Straw Final    Appearance, UA 06/15/2024 Clear  Clear Final    pH, UA 06/15/2024 5.5  5.0 - 8.0 Final    Specific Gravity, UA 06/15/2024 1.007  1.005 - 1.030 Final    Glucose, UA 06/15/2024 Negative  Negative Final    Ketones, UA 06/15/2024 Negative  Negative Final    Bilirubin, UA 06/15/2024 Negative  Negative Final    Blood, UA 06/15/2024 Negative  Negative Final    Protein, UA 06/15/2024 Negative  Negative Final    Leuk Esterase, UA 06/15/2024 Negative  Negative Final    Nitrite, UA 06/15/2024 Negative  Negative Final    Urobilinogen, UA 06/15/2024 0.2 E.U./dL  0.2 - 1.0 E.U./dL Final    WBC 06/15/2024 9.37  3.40 - 10.80 10*3/mm3 Final    RBC 06/15/2024 5.43  4.14 -  5.80 10*6/mm3 Final    Hemoglobin 06/15/2024 15.7  13.0 - 17.7 g/dL Final    Hematocrit 06/15/2024 45.9  37.5 - 51.0 % Final    MCV 06/15/2024 84.5  79.0 - 97.0 fL Final    MCH 06/15/2024 28.9  26.6 - 33.0 pg Final    MCHC 06/15/2024 34.2  31.5 - 35.7 g/dL Final    RDW 06/15/2024 12.3  12.3 - 15.4 % Final    RDW-SD 06/15/2024 37.0  37.0 - 54.0 fl Final    MPV 06/15/2024 9.7  6.0 - 12.0 fL Final    Platelets 06/15/2024 353  140 - 450 10*3/mm3 Final    Neutrophil % 06/15/2024 67.4  42.7 - 76.0 % Final    Lymphocyte % 06/15/2024 18.0 (L)  19.6 - 45.3 % Final    Monocyte % 06/15/2024 10.6  5.0 - 12.0 % Final    Eosinophil % 06/15/2024 3.2  0.3 - 6.2 % Final    Basophil % 06/15/2024 0.6  0.0 - 1.5 % Final    Immature Grans % 06/15/2024 0.2  0.0 - 0.5 % Final    Neutrophils, Absolute 06/15/2024 6.31  1.70 - 7.00 10*3/mm3 Final    Lymphocytes, Absolute 06/15/2024 1.69  0.70 - 3.10 10*3/mm3 Final    Monocytes, Absolute 06/15/2024 0.99 (H)  0.10 - 0.90 10*3/mm3 Final    Eosinophils, Absolute 06/15/2024 0.30  0.00 - 0.40 10*3/mm3 Final    Basophils, Absolute 06/15/2024 0.06  0.00 - 0.20 10*3/mm3 Final    Immature Grans, Absolute 06/15/2024 0.02  0.00 - 0.05 10*3/mm3 Final    nRBC 06/15/2024 0.0  0.0 - 0.2 /100 WBC Final       Imaging:   Peripheral Block  Liford, Elizabeth N, CRNA     7/25/2024 10:58 AM  Peripheral Block    Patient reassessed immediately prior to procedure    Patient location during procedure: OR  Start time: 7/25/2024 10:44 AM  Stop time: 7/25/2024 10:46 AM  Reason for block: at surgeon's request and post-op pain management  Performed by  CRNA/CAA: Elizabeth Chester CRNA  Preanesthetic Checklist  Completed: patient identified, IV checked, site marked, risks and benefits   discussed, surgical consent, monitors and equipment checked, pre-op   evaluation and timeout performed  Prep:  Pt Position: supine  Sterile barriers:cap, gloves, sterile barriers and mask  Prep: ChloraPrep  Patient monitoring: blood  "pressure monitoring, continuous pulse oximetry   and EKG  Procedure    Nursing cardiac assessment comments yes: Sedation, GA, Spinal,Epidural   Performed under: general  Guidance:ultrasound guided    ULTRASOUND INTERPRETATION.  Using ultrasound guidance a 20 G (20g 4\"   Stimuplex) gauge needle was placed in close proximity to the nerve, at   which point, under ultrasound guidance anesthetic was injected in the area   of the nerve and spread of the anesthesia was seen on ultrasound in close   proximity thereto.  There were no abnormalities seen on ultrasound; a   digital image was taken; and the patient tolerated the procedure with no   complications. Images:still images obtained    Laterality:Bilateral  Block Type:TAP  Injection Technique:single-shot  Needle Type:short-bevel  Needle Gauge:20 G  Resistance on Injection: none    Medications Used: dexamethasone (DECADRON) injection - Peripheral Nerve,   Transversus Abdominus Plane   8 mg - 7/25/2024 10:46:00 AM  ropivacaine (NAROPIN) injection 0.5 % - Peripheral Nerve, Transversus   Abdominus Plane   200 mg - 7/25/2024 10:46:00 AM    Medications  Comment:Block Injection:  Total volume divided equally between all 4   injection sites    Post Assessment  Injection Assessment: negative aspiration for heme, incremental injection   and no paresthesia on injection  Patient Tolerance:comfortable throughout block  Complications:no  Additional Notes  The pt was in the supine position under general anesthesia    Under Ultrasound guidance, a BBraun 4inch 360 degree needle was advanced   with Normal Saline hydro dissection of tissue.  The Internal Oblique and   Transversus Abdominus muscles where visualized.  At or before the   aponeurosis of Internal Oblique, local anesthetic spread was visualized in   the Transversus Abdominus Plane. Injection was made incrementally with   aspiration every 5 mls.  There was no  intravascular injection,  injection   pressure was normal, there was " no neural injection, and the procedure was   completed without difficulty. The same procedure was completed for left   and right sided lateral tap blocks.    Under Ultrasound guidance, a Persaud 4inch 360 degree needle was advanced   with Normal Saline hydro dissection of tissue.  The Rectus and Transversus   Abdominus muscles where visualized.  The needle tip was placed between the   Transversus Abdominus and rectus abdominus, local anesthetic spread was   visualized in the Transversus Abdominus Plane. Injection was made   incrementally with aspiration every 5 mls.  There was no  intravascular   injection,  injection pressure was normal, there was no neural injection,   and the procedure was completed without difficulty. The same procedure was   completed for left and right sided subcostal tap blocks. Thank You.    Performed by: Elizabeth Chester CRNA        Assessment & Plan   Diagnoses and all orders for this visit:    1. RUQ pain (Primary)    2. Umbilical hernia without obstruction and without gangrene    Luke is a 28-year-old male presents for evaluation for right upper quadrant pain and umbilical hernia.  He is doing well after cholecystectomy and umbilical hernia repair.  He will continue with lifting restriction for 2 weeks.  The patient will also undergo surveillance colonoscopy as he has a father who has passed away with colon cancer in his late 30s early 40s and an aunt currently battling colon cancer.    Visit Diagnoses:    ICD-10-CM ICD-9-CM   1. RUQ pain  R10.11 789.01   2. Umbilical hernia without obstruction and without gangrene  K42.9 553.1         MEDS ORDERED DURING VISIT:  No orders of the defined types were placed in this encounter.      No follow-ups on file.             This document has been electronically signed by Brice Vargas MD  August 13, 2024 13:28 EDT    Please note that portions of this note were completed with a voice recognition program.

## 2024-08-13 NOTE — PROGRESS NOTES
Subjective   Luke Wadsworth is a 29 y.o. male who presents today for Initial Evaluation    Chief Complaint:    Chief Complaint   Patient presents with    Post-op        History of Present Illness:    History of Present Illness Luke is a 28-year-old male presents for an evaluation for right upper quadrant pain.  He recently underwent cholecystectomy with primary umbilical hernia repair.  He is doing well and his incisions are healing without issue.  Patient does state he has a family history of colon cancer and his father had colon cancer in his late 30s and he is due to initiate surveillance colonoscopy for his family history.  He currently has an aunt also dealing with colon cancer.    The following portions of the patient's history were reviewed and updated as appropriate: allergies, current medications, past family history, past medical history, past social history, past surgical history and problem list.    Past Medical History:  Past Medical History:   Diagnosis Date    Abdominal pain     Asthma     GERD (gastroesophageal reflux disease)     Kidney stone     Nausea and vomiting     Umbilical hernia        Social History:  Social History     Socioeconomic History    Marital status:    Tobacco Use    Smoking status: Every Day     Current packs/day: 0.25     Average packs/day: 0.3 packs/day for 0.4 years (0.1 ttl pk-yrs)     Types: Cigarettes     Start date: 4/7/2024     Passive exposure: Current    Smokeless tobacco: Current     Types: Chew, Snuff   Vaping Use    Vaping status: Every Day    Substances: Nicotine, THC, Flavoring    Devices: Disposable   Substance and Sexual Activity    Alcohol use: Yes     Alcohol/week: 20.0 standard drinks of alcohol     Types: 20 Cans of beer per week     Comment: WEEKLY    Drug use: Never    Sexual activity: Defer     Partners: Female     Birth control/protection: None       Family History:  History reviewed. No pertinent family history.    Past Surgical  History:  Past Surgical History:   Procedure Laterality Date    CHOLECYSTECTOMY N/A 7/25/2024    Procedure: CHOLECYSTECTOMY LAPAROSCOPIC WITH DAVINCI ROBOT;  Surgeon: Brice Vargas MD;  Location: [facility] OR;  Service: Robotics - Accumetricsinci;  Laterality: N/A;    CLAVICLE SURGERY Right     TONSILLECTOMY AND ADENOIDECTOMY      UMBILICAL HERNIA REPAIR N/A 7/25/2024    Procedure: UMBILICAL HERNIA REPAIR LAPAROSCOPIC WITH DAVINCI ROBOT;  Surgeon: Brice Vargas MD;  Location: [facility] OR;  Service: Robotics - Accumetricsinci;  Laterality: N/A;       Problem List:  Patient Active Problem List   Diagnosis    RUQ pain    Umbilical hernia without obstruction and without gangrene       Allergy:   No Known Allergies     Current Medications:   Current Outpatient Medications   Medication Sig Dispense Refill    albuterol sulfate  (90 Base) MCG/ACT inhaler Inhale 2 puffs Every 4 (Four) Hours As Needed for Wheezing.      acetaminophen (TYLENOL) 325 MG tablet Take 2 tablets by mouth Every 4 (Four) Hours As Needed for Mild Pain. (Patient not taking: Reported on 8/13/2024) 30 tablet 0    ibuprofen (ADVIL,MOTRIN) 600 MG tablet Take 1 tablet by mouth Every 6 (Six) Hours As Needed for Mild Pain. (Patient not taking: Reported on 8/13/2024) 30 tablet 0    ibuprofen (ADVIL,MOTRIN) 800 MG tablet Take 1 tablet by mouth Every 6 (Six) Hours As Needed for Mild Pain. (Patient not taking: Reported on 8/13/2024)      traMADol (ULTRAM) 50 MG tablet Take 1 tablet by mouth Every 8 (Eight) Hours As Needed for Moderate Pain. (Patient not taking: Reported on 8/13/2024) 12 tablet 0     No current facility-administered medications for this visit.       Review of Systems:    Review of Systems   Gastrointestinal:  Positive for abdominal pain, diarrhea, nausea and vomiting.         Physical Exam:   Physical Exam  Constitutional:       Appearance: Normal appearance.   HENT:      Head: Normocephalic and atraumatic.      Right Ear: External ear normal.     "  Left Ear: External ear normal.   Eyes:      Conjunctiva/sclera: Conjunctivae normal.   Pulmonary:      Effort: Pulmonary effort is normal.   Abdominal:      General: Abdomen is flat.   Musculoskeletal:         General: Normal range of motion.      Cervical back: Normal range of motion and neck supple.   Skin:     General: Skin is warm.      Capillary Refill: Capillary refill takes less than 2 seconds.   Neurological:      General: No focal deficit present.      Mental Status: He is alert and oriented to person, place, and time.   Psychiatric:         Mood and Affect: Mood normal.         Behavior: Behavior normal.         Vitals:  Height 175.3 cm (69\"), weight 65.3 kg (144 lb).   Body mass index is 21.27 kg/m².     Lab Results:   Admission on 2024, Discharged on 2024   Component Date Value Ref Range Status    Reference Lab Report 2024    Final                    Value:[facility]  [address] [place] [address]  Phone: [phone] or [phone]  Fax: [phone]  Jacinto Young M.D., Medical Director    PATIENT NAME                           LABORATORY NO.  [id number]  [address]ESAR.                    [id number]  [id number]                         AGE              SEX  SSN           CLIENT REF #  [facility] RK              29      1995      [id number]   [id number]    [address]                     REQUESTING M.D.     ATTENDING [place] [address]                   DON MARTINES  DATE COLLECTED      DATE RECEIVED      DATE REPORTED  2024    DIAGNOSIS:  GALLBLADDER:  Benign gallbladder with scant chronic inflammation    CAM    CLINICAL HISTORY:  Right upper quadrant pain, umbilical hernia without obstruction and without  gangrene    SPECIMENS RECEIVED:  GALLBLADDER    MICROSCOPIC DESCRIPTION:  Tissue blocks are prepared and slides                           are examined " microscopically. See  diagnosis for details.    Professional interpretation rendered by Rosa Duque M.D., GUILHERME at  [facility] [place], [address] [place] [id number].    GROSS DESCRIPTION:  Received in formalin labeled gallbladder is a 7.7 x 3.0 x 2.7 cm intact  gallbladder with an open cystic duct that is patent. The serosa is tan-pink,  smooth, and glistening. The hepatic surface is tan-red and shaggy.  The lumen  contains an abundant amount of green viscous bile with no calculi present.  The  mucosa is bile-stained and velvety. The gallbladder wall has an average wall  thickness of 0.1 cm. No pericystic duct lymph node is identified. No mucosal  polyps or other lesions are present.  Representative sections of the gallbladder  wall and the entire en face cystic duct margin are submitted in 1 cassette labeled  A1.    [facility]    REVIEWED, DIAGNOSED AND ELECTRONICALLY  SIGNED BY:    Rosa Duque M.D., GUILHERME  CPT CODES:  23669     Pre-Admission Testing on 07/23/2024   Component Date Value Ref Range Status    Glucose 07/23/2024 72  65 - 99 mg/dL Final    BUN 07/23/2024 10  6 - 20 mg/dL Final    Creatinine 07/23/2024 0.88  0.76 - 1.27 mg/dL Final    Sodium 07/23/2024 141  136 - 145 mmol/L Final    Potassium 07/23/2024 4.0  3.5 - 5.2 mmol/L Final    Chloride 07/23/2024 104  98 - 107 mmol/L Final    CO2 07/23/2024 25.5  22.0 - 29.0 mmol/L Final    Calcium 07/23/2024 9.8  8.6 - 10.5 mg/dL Final    BUN/Creatinine Ratio 07/23/2024 11.4  7.0 - 25.0 Final    Anion Gap 07/23/2024 11.5  5.0 - 15.0 mmol/L Final    eGFR 07/23/2024 120.9  >60.0 mL/min/1.73 Final    WBC 07/23/2024 5.26  3.40 - 10.80 10*3/mm3 Final    RBC 07/23/2024 5.32  4.14 - 5.80 10*6/mm3 Final    Hemoglobin 07/23/2024 15.0  13.0 - 17.7 g/dL Final    Hematocrit 07/23/2024 45.7  37.5 - 51.0 % Final    MCV 07/23/2024 85.9  79.0 - 97.0 fL Final    MCH 07/23/2024 28.2  26.6 - 33.0 pg Final    MCHC 07/23/2024 32.8  31.5 - 35.7 g/dL Final    RDW  07/23/2024 12.4  12.3 - 15.4 % Final    RDW-SD 07/23/2024 38.5  37.0 - 54.0 fl Final    MPV 07/23/2024 10.3  6.0 - 12.0 fL Final    Platelets 07/23/2024 328  140 - 450 10*3/mm3 Final   Admission on 06/15/2024, Discharged on 06/15/2024   Component Date Value Ref Range Status    Glucose 06/15/2024 100 (H)  65 - 99 mg/dL Final    BUN 06/15/2024 11  6 - 20 mg/dL Final    Creatinine 06/15/2024 1.37 (H)  0.76 - 1.27 mg/dL Final    Sodium 06/15/2024 141  136 - 145 mmol/L Final    Potassium 06/15/2024 3.9  3.5 - 5.2 mmol/L Final    Chloride 06/15/2024 103  98 - 107 mmol/L Final    CO2 06/15/2024 25.8  22.0 - 29.0 mmol/L Final    Calcium 06/15/2024 10.3  8.6 - 10.5 mg/dL Final    Total Protein 06/15/2024 8.1  6.0 - 8.5 g/dL Final    Albumin 06/15/2024 4.9  3.5 - 5.2 g/dL Final    ALT (SGPT) 06/15/2024 16  1 - 41 U/L Final    AST (SGOT) 06/15/2024 24  1 - 40 U/L Final    Alkaline Phosphatase 06/15/2024 129 (H)  39 - 117 U/L Final    Total Bilirubin 06/15/2024 0.9  0.0 - 1.2 mg/dL Final    Globulin 06/15/2024 3.2  gm/dL Final    A/G Ratio 06/15/2024 1.5  g/dL Final    BUN/Creatinine Ratio 06/15/2024 8.0  7.0 - 25.0 Final    Anion Gap 06/15/2024 12.2  5.0 - 15.0 mmol/L Final    eGFR 06/15/2024 73.0  >60.0 mL/min/1.73 Final    Color, UA 06/15/2024 Yellow  Yellow, Straw Final    Appearance, UA 06/15/2024 Clear  Clear Final    pH, UA 06/15/2024 5.5  5.0 - 8.0 Final    Specific Gravity, UA 06/15/2024 1.007  1.005 - 1.030 Final    Glucose, UA 06/15/2024 Negative  Negative Final    Ketones, UA 06/15/2024 Negative  Negative Final    Bilirubin, UA 06/15/2024 Negative  Negative Final    Blood, UA 06/15/2024 Negative  Negative Final    Protein, UA 06/15/2024 Negative  Negative Final    Leuk Esterase, UA 06/15/2024 Negative  Negative Final    Nitrite, UA 06/15/2024 Negative  Negative Final    Urobilinogen, UA 06/15/2024 0.2 E.U./dL  0.2 - 1.0 E.U./dL Final    WBC 06/15/2024 9.37  3.40 - 10.80 10*3/mm3 Final    RBC 06/15/2024 5.43  4.14 -  5.80 10*6/mm3 Final    Hemoglobin 06/15/2024 15.7  13.0 - 17.7 g/dL Final    Hematocrit 06/15/2024 45.9  37.5 - 51.0 % Final    MCV 06/15/2024 84.5  79.0 - 97.0 fL Final    MCH 06/15/2024 28.9  26.6 - 33.0 pg Final    MCHC 06/15/2024 34.2  31.5 - 35.7 g/dL Final    RDW 06/15/2024 12.3  12.3 - 15.4 % Final    RDW-SD 06/15/2024 37.0  37.0 - 54.0 fl Final    MPV 06/15/2024 9.7  6.0 - 12.0 fL Final    Platelets 06/15/2024 353  140 - 450 10*3/mm3 Final    Neutrophil % 06/15/2024 67.4  42.7 - 76.0 % Final    Lymphocyte % 06/15/2024 18.0 (L)  19.6 - 45.3 % Final    Monocyte % 06/15/2024 10.6  5.0 - 12.0 % Final    Eosinophil % 06/15/2024 3.2  0.3 - 6.2 % Final    Basophil % 06/15/2024 0.6  0.0 - 1.5 % Final    Immature Grans % 06/15/2024 0.2  0.0 - 0.5 % Final    Neutrophils, Absolute 06/15/2024 6.31  1.70 - 7.00 10*3/mm3 Final    Lymphocytes, Absolute 06/15/2024 1.69  0.70 - 3.10 10*3/mm3 Final    Monocytes, Absolute 06/15/2024 0.99 (H)  0.10 - 0.90 10*3/mm3 Final    Eosinophils, Absolute 06/15/2024 0.30  0.00 - 0.40 10*3/mm3 Final    Basophils, Absolute 06/15/2024 0.06  0.00 - 0.20 10*3/mm3 Final    Immature Grans, Absolute 06/15/2024 0.02  0.00 - 0.05 10*3/mm3 Final    nRBC 06/15/2024 0.0  0.0 - 0.2 /100 WBC Final       Imaging:   Peripheral Block  Liford, Elizabeth N, CRNA     7/25/2024 10:58 AM  Peripheral Block    Patient reassessed immediately prior to procedure    Patient location during procedure: OR  Start time: 7/25/2024 10:44 AM  Stop time: 7/25/2024 10:46 AM  Reason for block: at surgeon's request and post-op pain management  Performed by  CRNA/CAA: Elizabeth Chester CRNA  Preanesthetic Checklist  Completed: patient identified, IV checked, site marked, risks and benefits   discussed, surgical consent, monitors and equipment checked, pre-op   evaluation and timeout performed  Prep:  Pt Position: supine  Sterile barriers:cap, gloves, sterile barriers and mask  Prep: ChloraPrep  Patient monitoring: blood  "pressure monitoring, continuous pulse oximetry   and EKG  Procedure    Nursing cardiac assessment comments yes: Sedation, GA, Spinal,Epidural   Performed under: general  Guidance:ultrasound guided    ULTRASOUND INTERPRETATION.  Using ultrasound guidance a 20 G (20g 4\"   Stimuplex) gauge needle was placed in close proximity to the nerve, at   which point, under ultrasound guidance anesthetic was injected in the area   of the nerve and spread of the anesthesia was seen on ultrasound in close   proximity thereto.  There were no abnormalities seen on ultrasound; a   digital image was taken; and the patient tolerated the procedure with no   complications. Images:still images obtained    Laterality:Bilateral  Block Type:TAP  Injection Technique:single-shot  Needle Type:short-bevel  Needle Gauge:20 G  Resistance on Injection: none    Medications Used: dexamethasone (DECADRON) injection - Peripheral Nerve,   Transversus Abdominus Plane   8 mg - 7/25/2024 10:46:00 AM  ropivacaine (NAROPIN) injection 0.5 % - Peripheral Nerve, Transversus   Abdominus Plane   200 mg - 7/25/2024 10:46:00 AM    Medications  Comment:Block Injection:  Total volume divided equally between all 4   injection sites    Post Assessment  Injection Assessment: negative aspiration for heme, incremental injection   and no paresthesia on injection  Patient Tolerance:comfortable throughout block  Complications:no  Additional Notes  The pt was in the supine position under general anesthesia    Under Ultrasound guidance, a BBraun 4inch 360 degree needle was advanced   with Normal Saline hydro dissection of tissue.  The Internal Oblique and   Transversus Abdominus muscles where visualized.  At or before the   aponeurosis of Internal Oblique, local anesthetic spread was visualized in   the Transversus Abdominus Plane. Injection was made incrementally with   aspiration every 5 mls.  There was no  intravascular injection,  injection   pressure was normal, there was " no neural injection, and the procedure was   completed without difficulty. The same procedure was completed for left   and right sided lateral tap blocks.    Under Ultrasound guidance, a Persaud 4inch 360 degree needle was advanced   with Normal Saline hydro dissection of tissue.  The Rectus and Transversus   Abdominus muscles where visualized.  The needle tip was placed between the   Transversus Abdominus and rectus abdominus, local anesthetic spread was   visualized in the Transversus Abdominus Plane. Injection was made   incrementally with aspiration every 5 mls.  There was no  intravascular   injection,  injection pressure was normal, there was no neural injection,   and the procedure was completed without difficulty. The same procedure was   completed for left and right sided subcostal tap blocks. Thank You.    Performed by: Elizabeth Chester CRNA        Assessment & Plan   Diagnoses and all orders for this visit:    1. RUQ pain (Primary)    2. Umbilical hernia without obstruction and without gangrene    Luke is a 28-year-old male presents for evaluation for right upper quadrant pain and umbilical hernia.  He is doing well after cholecystectomy and umbilical hernia repair.  He will continue with lifting restriction for 2 weeks.  The patient will also undergo surveillance colonoscopy as he has a father who has passed away with colon cancer in his late 30s early 40s and an aunt currently battling colon cancer.    Visit Diagnoses:    ICD-10-CM ICD-9-CM   1. RUQ pain  R10.11 789.01   2. Umbilical hernia without obstruction and without gangrene  K42.9 553.1         MEDS ORDERED DURING VISIT:  No orders of the defined types were placed in this encounter.      No follow-ups on file.             This document has been electronically signed by Brice Vargas MD  August 13, 2024 13:28 EDT    Please note that portions of this note were completed with a voice recognition program.

## 2024-09-11 ENCOUNTER — ANESTHESIA (OUTPATIENT)
Dept: PERIOP | Facility: HOSPITAL | Age: 29
End: 2024-09-11
Payer: COMMERCIAL

## 2024-09-11 ENCOUNTER — HOSPITAL ENCOUNTER (OUTPATIENT)
Facility: HOSPITAL | Age: 29
Setting detail: HOSPITAL OUTPATIENT SURGERY
Discharge: HOME | End: 2024-09-11
Attending: SURGERY | Admitting: SURGERY
Payer: COMMERCIAL

## 2024-09-11 ENCOUNTER — ANESTHESIA EVENT (OUTPATIENT)
Dept: PERIOP | Facility: HOSPITAL | Age: 29
End: 2024-09-11
Payer: COMMERCIAL

## 2024-09-11 VITALS
BODY MASS INDEX: 21.48 KG/M2 | DIASTOLIC BLOOD PRESSURE: 67 MMHG | RESPIRATION RATE: 18 BRPM | SYSTOLIC BLOOD PRESSURE: 104 MMHG | WEIGHT: 145 LBS | TEMPERATURE: 97.8 F | OXYGEN SATURATION: 95 % | HEART RATE: 56 BPM | HEIGHT: 69 IN

## 2024-09-11 PROCEDURE — 25810000003 LACTATED RINGERS PER 1000 ML: Performed by: ANESTHESIOLOGY

## 2024-09-11 PROCEDURE — 25010000002 FENTANYL CITRATE (PF) 50 MCG/ML SOLUTION: Performed by: NURSE ANESTHETIST, CERTIFIED REGISTERED

## 2024-09-11 PROCEDURE — 25010000002 PROPOFOL 10 MG/ML EMULSION: Performed by: NURSE ANESTHETIST, CERTIFIED REGISTERED

## 2024-09-11 RX ORDER — MIDAZOLAM HYDROCHLORIDE 1 MG/ML
1 INJECTION INTRAMUSCULAR; INTRAVENOUS
Status: DISCONTINUED | OUTPATIENT
Start: 2024-09-11 | End: 2024-09-11 | Stop reason: HOSPADM

## 2024-09-11 RX ORDER — OXYCODONE AND ACETAMINOPHEN 5; 325 MG/1; MG/1
1 TABLET ORAL ONCE AS NEEDED
Status: DISCONTINUED | OUTPATIENT
Start: 2024-09-11 | End: 2024-09-11 | Stop reason: HOSPADM

## 2024-09-11 RX ORDER — MEPERIDINE HYDROCHLORIDE 25 MG/ML
12.5 INJECTION INTRAMUSCULAR; INTRAVENOUS; SUBCUTANEOUS
Status: DISCONTINUED | OUTPATIENT
Start: 2024-09-11 | End: 2024-09-11 | Stop reason: HOSPADM

## 2024-09-11 RX ORDER — SODIUM CHLORIDE, SODIUM LACTATE, POTASSIUM CHLORIDE, CALCIUM CHLORIDE 600; 310; 30; 20 MG/100ML; MG/100ML; MG/100ML; MG/100ML
100 INJECTION, SOLUTION INTRAVENOUS ONCE AS NEEDED
Status: DISCONTINUED | OUTPATIENT
Start: 2024-09-11 | End: 2024-09-11 | Stop reason: HOSPADM

## 2024-09-11 RX ORDER — IPRATROPIUM BROMIDE AND ALBUTEROL SULFATE 2.5; .5 MG/3ML; MG/3ML
3 SOLUTION RESPIRATORY (INHALATION) ONCE AS NEEDED
Status: DISCONTINUED | OUTPATIENT
Start: 2024-09-11 | End: 2024-09-11 | Stop reason: HOSPADM

## 2024-09-11 RX ORDER — PROPOFOL 10 MG/ML
VIAL (ML) INTRAVENOUS AS NEEDED
Status: DISCONTINUED | OUTPATIENT
Start: 2024-09-11 | End: 2024-09-11 | Stop reason: SURG

## 2024-09-11 RX ORDER — SODIUM CHLORIDE 0.9 % (FLUSH) 0.9 %
10 SYRINGE (ML) INJECTION AS NEEDED
Status: DISCONTINUED | OUTPATIENT
Start: 2024-09-11 | End: 2024-09-11 | Stop reason: HOSPADM

## 2024-09-11 RX ORDER — SODIUM CHLORIDE 9 MG/ML
40 INJECTION, SOLUTION INTRAVENOUS AS NEEDED
Status: DISCONTINUED | OUTPATIENT
Start: 2024-09-11 | End: 2024-09-11 | Stop reason: HOSPADM

## 2024-09-11 RX ORDER — ONDANSETRON 2 MG/ML
4 INJECTION INTRAMUSCULAR; INTRAVENOUS AS NEEDED
Status: DISCONTINUED | OUTPATIENT
Start: 2024-09-11 | End: 2024-09-11 | Stop reason: HOSPADM

## 2024-09-11 RX ORDER — KETOROLAC TROMETHAMINE 30 MG/ML
30 INJECTION, SOLUTION INTRAMUSCULAR; INTRAVENOUS EVERY 6 HOURS PRN
Status: DISCONTINUED | OUTPATIENT
Start: 2024-09-11 | End: 2024-09-11 | Stop reason: HOSPADM

## 2024-09-11 RX ORDER — FENTANYL CITRATE 50 UG/ML
INJECTION, SOLUTION INTRAMUSCULAR; INTRAVENOUS AS NEEDED
Status: DISCONTINUED | OUTPATIENT
Start: 2024-09-11 | End: 2024-09-11 | Stop reason: SURG

## 2024-09-11 RX ORDER — SODIUM CHLORIDE, SODIUM LACTATE, POTASSIUM CHLORIDE, CALCIUM CHLORIDE 600; 310; 30; 20 MG/100ML; MG/100ML; MG/100ML; MG/100ML
125 INJECTION, SOLUTION INTRAVENOUS ONCE
Status: COMPLETED | OUTPATIENT
Start: 2024-09-11 | End: 2024-09-11

## 2024-09-11 RX ORDER — SODIUM CHLORIDE 0.9 % (FLUSH) 0.9 %
10 SYRINGE (ML) INJECTION EVERY 12 HOURS SCHEDULED
Status: DISCONTINUED | OUTPATIENT
Start: 2024-09-11 | End: 2024-09-11 | Stop reason: HOSPADM

## 2024-09-11 RX ORDER — FENTANYL CITRATE 50 UG/ML
50 INJECTION, SOLUTION INTRAMUSCULAR; INTRAVENOUS
Status: DISCONTINUED | OUTPATIENT
Start: 2024-09-11 | End: 2024-09-11 | Stop reason: HOSPADM

## 2024-09-11 RX ADMIN — PROPOFOL 100 MG: 10 INJECTION, EMULSION INTRAVENOUS at 07:32

## 2024-09-11 RX ADMIN — PROPOFOL 160 MCG/KG/MIN: 10 INJECTION, EMULSION INTRAVENOUS at 07:33

## 2024-09-11 RX ADMIN — SODIUM CHLORIDE, POTASSIUM CHLORIDE, SODIUM LACTATE AND CALCIUM CHLORIDE: 600; 310; 30; 20 INJECTION, SOLUTION INTRAVENOUS at 07:30

## 2024-09-11 RX ADMIN — FENTANYL CITRATE 100 MCG: 50 INJECTION INTRAMUSCULAR; INTRAVENOUS at 07:32

## 2024-09-11 NOTE — ANESTHESIA POSTPROCEDURE EVALUATION
Patient: Luke Wadsworth    Procedure Summary       Date: 09/11/24 Room / Location: [facility] OR [address] [place] [address] COR OR    Anesthesia Start: 0730 Anesthesia Stop: 0747    Procedure: COLONOSCOPY Diagnosis:       RUQ pain      (RUQ pain [R10.11])    Surgeons: Brice Vargas MD Provider: Clifton Yusuf MD    Anesthesia Type: general ASA Status: 2            Anesthesia Type: general    Vitals  Vitals Value Taken Time   /67 09/11/24 0810   Temp 97.8 °F (36.6 °C) 09/11/24 0750   Pulse 67 09/11/24 0814   Resp 18 09/11/24 0805   SpO2 99 % 09/11/24 0814   Vitals shown include unfiled device data.        Post Anesthesia Care and Evaluation    Patient location during evaluation: bedside  Patient participation: complete - patient participated  Level of consciousness: awake and alert  Pain score: 1  Pain management: adequate    Airway patency: patent  Anesthetic complications: No anesthetic complications  PONV Status: none  Cardiovascular status: acceptable  Respiratory status: acceptable  Hydration status: acceptable

## 2024-09-11 NOTE — ANESTHESIA PREPROCEDURE EVALUATION
Anesthesia Evaluation     Patient summary reviewed and Nursing notes reviewed   no history of anesthetic complications:   NPO Solid Status: > 8 hours  NPO Liquid Status: > 8 hours           Airway   Mallampati: II  TM distance: >3 FB  Neck ROM: full  No difficulty expected  Dental - normal exam     Pulmonary - normal exam    breath sounds clear to auscultation  (+) a smoker Former, asthma,  Cardiovascular - normal exam  Exercise tolerance: good (4-7 METS)    Rhythm: regular  Rate: normal        Neuro/Psych- negative ROS  GI/Hepatic/Renal/Endo    (+) GERD, renal disease- stones    Musculoskeletal (-) negative ROS    Abdominal  - normal exam    Abdomen: soft.   Substance History - negative use     OB/GYN          Other - negative ROS                     Anesthesia Plan    ASA 2     general       Anesthetic plan, risks, benefits, and alternatives have been provided, discussed and informed consent has been obtained with: patient.  Pre-procedure education provided  Use of blood products discussed with  Consented to blood products.    Plan discussed with CRNA.      CODE STATUS:

## (undated) DEVICE — CADIERE FORCEPS: Brand: ENDOWRIST

## (undated) DEVICE — BLADELESS OBTURATOR: Brand: WECK VISTA

## (undated) DEVICE — 40595 XL TRENDELENBURG POSITIONING KIT: Brand: 40595 XL TRENDELENBURG POSITIONING KIT

## (undated) DEVICE — DRV TRAIN NDL ENDOWRIST DAVINCI/X/XI LG

## (undated) DEVICE — MARKER,SKIN,WI/RULER AND LABELS: Brand: MEDLINE

## (undated) DEVICE — PERMANENT CAUTERY HOOK: Brand: ENDOWRIST

## (undated) DEVICE — GLV SURG PREMIERPRO MIC LTX PF SZ7.5 BRN

## (undated) DEVICE — Device

## (undated) DEVICE — PAD POSTN ARMBND 1P/U

## (undated) DEVICE — TIP COVER ACCESSORY

## (undated) DEVICE — SUT MNCRYL 4/0 PS2 18 IN

## (undated) DEVICE — ENDOGATOR TUBING FOR ENDOGATOR EGP-100 IRRIGATION PUMP,OLYMPUS OFP PUMP, OLYMPUS AFU-100 PUMP AND ERBE EIP2 PUMP: Brand: ENDOGATOR

## (undated) DEVICE — PK LAP GEN 70

## (undated) DEVICE — CVR DISP HUG U VAC STEEP TREND

## (undated) DEVICE — STERILE COTTON BALLS LARGE 5/P: Brand: MEDLINE

## (undated) DEVICE — SUT VIC 0/0 UR6 27IN DYED J603H

## (undated) DEVICE — 2, DISPOSABLE SUCTION/IRRIGATOR WITH DISPOSABLE TIP: Brand: STRYKEFLOW

## (undated) DEVICE — REDUCER: Brand: ENDOWRIST

## (undated) DEVICE — ARM DRAPE

## (undated) DEVICE — CANNULA SEAL

## (undated) DEVICE — INSUFFLATION NEEDLE TO ESTABLISH PNEUMOPERITONEUM.: Brand: INSUFFLATION NEEDLE

## (undated) DEVICE — UNDERGLV SURG BIOGEL INDICAT PF 8 GRN

## (undated) DEVICE — SEAL

## (undated) DEVICE — COVER,MAYO STAND,STERILE: Brand: MEDLINE

## (undated) DEVICE — CONN Y IRR DISP 1P/U

## (undated) DEVICE — MONOPOLAR CURVED SCISSORS: Brand: ENDOWRIST

## (undated) DEVICE — Device: Brand: DEFENDO AIR/WATER/SUCTION AND BIOPSY VALVE

## (undated) DEVICE — PAD GRND REM POLYHESIVE A/ DISP

## (undated) DEVICE — ENDOGATOR AUXILIARY WATER JET CONNECTOR: Brand: ENDOGATOR

## (undated) DEVICE — DRAPE,UTILTY,TAPE,15X26, 4EA/PK: Brand: MEDLINE

## (undated) DEVICE — TUBING, SUCTION, 1/4" X 20', STRAIGHT: Brand: MEDLINE INDUSTRIES, INC.

## (undated) DEVICE — LARGE HEM-O-LOK CLIP APPLIER: Brand: ENDOWRIST

## (undated) DEVICE — LARGE NEEDLE DRIVER: Brand: ENDOWRIST

## (undated) DEVICE — FENESTRATED BIPOLAR FORCEPS: Brand: ENDOWRIST

## (undated) DEVICE — HDRST POSTIN FM CRDL TRACH SLOT NONCOMRESS 9X8X4IN

## (undated) DEVICE — SUT MNCRYL PLS ANTIB UD 4/0 PS2 18IN

## (undated) DEVICE — DRSNG SURESITE WNDW 4X4.5